# Patient Record
Sex: FEMALE | Race: WHITE | Employment: OTHER | URBAN - METROPOLITAN AREA
[De-identification: names, ages, dates, MRNs, and addresses within clinical notes are randomized per-mention and may not be internally consistent; named-entity substitution may affect disease eponyms.]

---

## 2019-02-27 ENCOUNTER — TRANSFERRED RECORDS (OUTPATIENT)
Dept: HEALTH INFORMATION MANAGEMENT | Facility: CLINIC | Age: 74
End: 2019-02-27

## 2019-02-28 ENCOUNTER — TELEPHONE (OUTPATIENT)
Dept: OPHTHALMOLOGY | Facility: CLINIC | Age: 74
End: 2019-02-28

## 2019-02-28 NOTE — TELEPHONE ENCOUNTER
Patient is scheduled for surgery with Dr. Valdovinos      Spoke or left message with: patient    Date of Surgery: 3/7/19    Location: [facility] - due to cardiac history,insurance    Informed patient they will need an adult  Yes    Pre-op with surgeon (if applicable): VLADIMIR    H&P: Scheduled with Dr. Combs [facility] patient will call and schedule appointment.    Additional imaging/appointments: see and do evaluation on 3/6/19 with Dr. Elizabeth Valdovinos.    Surgery packet: mailed 2/28/19    Additional comments: Advised need adult surgery day and 1 day post op. Dr. Valdovinos will do only 1 day post op appointment. Dr. Ab Eaton will do all other post op appointments.

## 2019-03-04 ENCOUNTER — TRANSFERRED RECORDS (OUTPATIENT)
Dept: HEALTH INFORMATION MANAGEMENT | Facility: CLINIC | Age: 74
End: 2019-03-04

## 2019-03-05 ENCOUNTER — TELEPHONE (OUTPATIENT)
Dept: OPHTHALMOLOGY | Facility: CLINIC | Age: 74
End: 2019-03-05

## 2019-03-05 NOTE — TELEPHONE ENCOUNTER
Returned patient's call regarding surgery for 3/7/19 and [facility] and [facility] both out of network. Spoke with Dr. Valdovinos and we will have to reschedule case to [facility] for a later date. Patient aware I will work on a new surgery date. Advised to call me at [phone] with questions.

## 2019-04-24 ENCOUNTER — OFFICE VISIT (OUTPATIENT)
Dept: OPHTHALMOLOGY | Facility: CLINIC | Age: 74
End: 2019-04-24
Attending: OPHTHALMOLOGY
Payer: COMMERCIAL

## 2019-04-24 DIAGNOSIS — Z48.810 AFTERCARE FOLLOWING SURGERY OF A SENSORY ORGAN: ICD-10-CM

## 2019-04-24 DIAGNOSIS — H40.1133 PRIMARY OPEN ANGLE GLAUCOMA (POAG) OF BOTH EYES, SEVERE STAGE: Primary | ICD-10-CM

## 2019-04-24 PROCEDURE — 92083 EXTENDED VISUAL FIELD XM: CPT | Mod: ZF | Performed by: OPHTHALMOLOGY

## 2019-04-24 PROCEDURE — 76514 ECHO EXAM OF EYE THICKNESS: CPT | Mod: ZF | Performed by: OPHTHALMOLOGY

## 2019-04-24 PROCEDURE — 92133 CPTRZD OPH DX IMG PST SGM ON: CPT | Mod: ZF | Performed by: OPHTHALMOLOGY

## 2019-04-24 PROCEDURE — 92020 GONIOSCOPY: CPT | Mod: ZF | Performed by: OPHTHALMOLOGY

## 2019-04-24 PROCEDURE — G0463 HOSPITAL OUTPT CLINIC VISIT: HCPCS | Mod: ZF

## 2019-04-24 RX ORDER — HYDROCODONE BITARTRATE AND ACETAMINOPHEN 5; 325 MG/1; MG/1
1 TABLET ORAL
COMMUNITY
Start: 2019-04-01

## 2019-04-24 RX ORDER — BENZOCAINE/MENTHOL 6 MG-10 MG
LOZENGE MUCOUS MEMBRANE
COMMUNITY
Start: 2019-03-27

## 2019-04-24 RX ORDER — FLUCONAZOLE 150 MG/1
TABLET ORAL
Refills: 0 | COMMUNITY
Start: 2018-05-25 | End: 2020-02-14

## 2019-04-24 RX ORDER — NITROFURANTOIN 25; 75 MG/1; MG/1
CAPSULE ORAL
Refills: 3 | COMMUNITY
Start: 2019-04-02 | End: 2020-02-14

## 2019-04-24 RX ORDER — ATORVASTATIN CALCIUM 40 MG/1
40 TABLET, FILM COATED ORAL DAILY
COMMUNITY
Start: 2018-12-18 | End: 2020-04-15

## 2019-04-24 RX ORDER — NYSTATIN 100000 U/G
1 OINTMENT TOPICAL
COMMUNITY
Start: 2018-02-19 | End: 2020-02-14

## 2019-04-24 RX ORDER — BACLOFEN 10 MG/1
TABLET ORAL
COMMUNITY
Start: 2009-03-12

## 2019-04-24 RX ORDER — SODIUM CHLORIDE, SODIUM LACTATE, POTASSIUM CHLORIDE, CALCIUM CHLORIDE 600; 310; 30; 20 MG/100ML; MG/100ML; MG/100ML; MG/100ML
INJECTION, SOLUTION INTRAVENOUS CONTINUOUS
Status: CANCELLED | OUTPATIENT
Start: 2019-04-24

## 2019-04-24 RX ORDER — NITROFURANTOIN 25; 75 MG/1; MG/1
CAPSULE ORAL
COMMUNITY
Start: 2019-02-25 | End: 2020-04-09

## 2019-04-24 RX ORDER — PREGABALIN 75 MG/1
75 CAPSULE ORAL DAILY
COMMUNITY
Start: 2019-04-10 | End: 2020-04-15

## 2019-04-24 RX ORDER — CEPHALEXIN 500 MG/1
500 CAPSULE ORAL
COMMUNITY

## 2019-04-24 RX ORDER — AMMONIUM LACTATE 12 G/100G
CREAM TOPICAL
COMMUNITY
Start: 2017-06-07 | End: 2020-02-14

## 2019-04-24 RX ORDER — CLOTRIMAZOLE 1 %
CREAM (GRAM) TOPICAL
COMMUNITY
Start: 2019-03-27

## 2019-04-24 RX ORDER — LANCETS
EACH MISCELLANEOUS
COMMUNITY
Start: 2019-02-20

## 2019-04-24 RX ORDER — HYDROCHLOROTHIAZIDE 25 MG/1
25 TABLET ORAL
COMMUNITY
Start: 2018-04-09 | End: 2020-02-14

## 2019-04-24 RX ORDER — LOSARTAN POTASSIUM 50 MG/1
TABLET ORAL
COMMUNITY
Start: 2019-04-23

## 2019-04-24 RX ORDER — PREDNISOLONE ACETATE 10 MG/ML
1 SUSPENSION/ DROPS OPHTHALMIC 4 TIMES DAILY
Qty: 5 ML | Refills: 3 | Status: SHIPPED | OUTPATIENT
Start: 2019-04-24 | End: 2020-02-14

## 2019-04-24 RX ORDER — CLOPIDOGREL BISULFATE 75 MG/1
TABLET ORAL
Refills: 0 | COMMUNITY
Start: 2018-06-25 | End: 2020-02-14

## 2019-04-24 RX ORDER — BLOOD-GLUCOSE METER
EACH MISCELLANEOUS
COMMUNITY
Start: 2019-02-20

## 2019-04-24 RX ORDER — GABAPENTIN 100 MG/1
300 CAPSULE ORAL AT BEDTIME
COMMUNITY
Start: 2019-04-10

## 2019-04-24 RX ORDER — ISOSORBIDE MONONITRATE 30 MG/1
30 TABLET, EXTENDED RELEASE ORAL
COMMUNITY
Start: 2018-12-18 | End: 2020-02-14

## 2019-04-24 RX ORDER — ROPINIROLE 0.5 MG/1
TABLET, FILM COATED ORAL
Refills: 1 | COMMUNITY
Start: 2019-01-30 | End: 2020-02-14

## 2019-04-24 RX ORDER — NITROGLYCERIN 0.4 MG/1
0.4 TABLET SUBLINGUAL
COMMUNITY
Start: 2017-09-25

## 2019-04-24 RX ORDER — ASPIRIN 81 MG/1
81 TABLET, CHEWABLE ORAL
COMMUNITY
Start: 2018-11-20

## 2019-04-24 RX ORDER — BRIMONIDINE TARTRATE AND TIMOLOL MALEATE 2; 5 MG/ML; MG/ML
SOLUTION OPHTHALMIC
COMMUNITY
Start: 2016-06-20

## 2019-04-24 RX ORDER — BRINZOLAMIDE 10 MG/ML
SUSPENSION/ DROPS OPHTHALMIC
Refills: 6 | COMMUNITY
Start: 2018-05-21 | End: 2020-02-13

## 2019-04-24 ASSESSMENT — PACHYMETRY
OD_CT(UM): 544
OS_CT(UM): 557

## 2019-04-24 ASSESSMENT — REFRACTION_WEARINGRX
OD_SPHERE: -2.50
OS_CYLINDER: +1.25
OS_AXIS: 028
OD_AXIS: 012
OS_SPHERE: -1.50
SPECS_TYPE: SVL
OD_CYLINDER: +1.00

## 2019-04-24 ASSESSMENT — SLIT LAMP EXAM - LIDS
COMMENTS: NORMAL
COMMENTS: NORMAL

## 2019-04-24 ASSESSMENT — VISUAL ACUITY
OS_PH_CC+: -1
OS_CC: 20/30
OS_CC+: -1
METHOD: SNELLEN - LINEAR
CORRECTION_TYPE: GLASSES
OS_PH_CC: 20/25
OD_CC: 20/40

## 2019-04-24 ASSESSMENT — TONOMETRY
OD_IOP_MMHG: 27
OS_IOP_MMHG: 16
IOP_METHOD: APPLANATION

## 2019-04-24 ASSESSMENT — CONF VISUAL FIELD
OD_SUPERIOR_TEMPORAL_RESTRICTION: 3
OS_NORMAL: 1
OD_INFERIOR_NASAL_RESTRICTION: 3

## 2019-04-24 NOTE — NURSING NOTE
Chief Complaints and History of Present Illnesses   Patient presents with     Glaucoma     Chief Complaint(s) and History of Present Illness(es)     Glaucoma     Laterality: both eyes              Comments     Pt. States that IOP RE has been too high on maximum therapy.   Occasional discomfort RE, usually after injection for AMD.  No c/o [facility].  Danisha Caro COT 12:59 PM April 24, 2019

## 2019-04-24 NOTE — PROGRESS NOTES
CC: Referred by Dr Eaton for intraocular pressure too high with max medications right eye     HPI: Episodes of high intraocular pressure right eye over the past couple of years.  Had intraocular pressure spike with a recent Avastin injection.  On maximum medications.   Left eye has been OK     PAST OCULAR HISTORY/PROCEDURES:  Cataract extraction both eyes   Injections for age related macular degeneration right eye   Selective laser trabeculoplasty (SLT) right eye with intraocular pressure spike after that (steroid responder)    MAXIMUM INTRAOCULAR PRESSURE:  Over 60    MEDICATIONS:  Lumigan at bedtime both eyes   Combigan three times a day both eyes   Rhopressa at bedtime right eye     ANTICOAGULANTS/ANTIPLATELETS/FLOMAX: baby aspirin daily    PMH:  Systemic hypertension  Diabetes mellitus   Cardiac disease  Stroke    FAMILY/SOCIAL HISTORY:      + glaucoma mother, father and son      TESTING:  Octopus visual field 24-2   Right eye with decreased MD, defect both hemifields, and poor reliability   Left eye with good reliability and inferior arcuate    OCT retinal nerve fiber layer    Right eye diffuse thinning   Left eye superior thinning    EXAM:  Pallor of optic nerve right eye   Angles open    IMPRESSION:  Primary open angle glaucoma (POAG)    Adv right eye    Mod left eye  Pseudophakia  Age related macular degeneration requiring injections q 5 weeks right eye     PLAN:  Due to frequent injections for age related macular degeneration will do tube rather than trabeculectomy   Baerveldt with corneal patch right eye  Out patient  Block  Risks discussed      Attending Physician Attestation:  Complete documentation of historical and exam elements from today's encounter can be found in the full encounter summary report (not reduplicated in this progress note). I personally obtained the chief complaint(s) and history of present illness. I confirmed and edited asnecessary the review of systems, past medical/surgical  history, family history, social history, and examination findings as documented by others; and I examined the patient myself. I personally reviewed the relevant tests, images, and reports as documented above. I formulated and edited as necessary the assessment and plan and discussed the findings and management plan with the patient and family.  - Elizabeth Valdovinos MD 1:51 PM 4/24/2019    Staying at [facility] room [facility].  Cell [phone]

## 2019-04-25 ENCOUNTER — ANESTHESIA (OUTPATIENT)
Dept: SURGERY | Facility: CLINIC | Age: 74
End: 2019-04-25
Payer: COMMERCIAL

## 2019-04-25 ENCOUNTER — TRANSFERRED RECORDS (OUTPATIENT)
Dept: HEALTH INFORMATION MANAGEMENT | Facility: CLINIC | Age: 74
End: 2019-04-25

## 2019-04-25 ENCOUNTER — HOSPITAL ENCOUNTER (OUTPATIENT)
Facility: CLINIC | Age: 74
Discharge: HOME | End: 2019-04-25
Attending: OPHTHALMOLOGY | Admitting: OPHTHALMOLOGY
Payer: COMMERCIAL

## 2019-04-25 ENCOUNTER — ANESTHESIA EVENT (OUTPATIENT)
Dept: SURGERY | Facility: CLINIC | Age: 74
End: 2019-04-25
Payer: COMMERCIAL

## 2019-04-25 VITALS
BODY MASS INDEX: 30.73 KG/M2 | WEIGHT: 180 LBS | HEART RATE: 62 BPM | RESPIRATION RATE: 16 BRPM | DIASTOLIC BLOOD PRESSURE: 75 MMHG | TEMPERATURE: 97.2 F | HEIGHT: 64 IN | OXYGEN SATURATION: 94 % | SYSTOLIC BLOOD PRESSURE: 130 MMHG

## 2019-04-25 DIAGNOSIS — H40.1113 PRIMARY OPEN ANGLE GLAUCOMA OF RIGHT EYE, SEVERE STAGE: Primary | ICD-10-CM

## 2019-04-25 LAB — GLUCOSE BLDC GLUCOMTR-MCNC: 125 MG/DL (ref 70–99)

## 2019-04-25 PROCEDURE — 71000028 ZZH EYE RECOVERY PHASE 2 EACH 15 MINS: Performed by: OPHTHALMOLOGY

## 2019-04-25 PROCEDURE — 27210794 ZZH OR GENERAL SUPPLY STERILE: Performed by: OPHTHALMOLOGY

## 2019-04-25 PROCEDURE — 25800030 ZZH RX IP 258 OP 636: Performed by: NURSE ANESTHETIST, CERTIFIED REGISTERED

## 2019-04-25 PROCEDURE — 25800030 ZZH RX IP 258 OP 636: Performed by: ANESTHESIOLOGY

## 2019-04-25 PROCEDURE — 25000125 ZZHC RX 250: Performed by: OPHTHALMOLOGY

## 2019-04-25 PROCEDURE — 25000128 H RX IP 250 OP 636: Performed by: NURSE ANESTHETIST, CERTIFIED REGISTERED

## 2019-04-25 PROCEDURE — 37000009 ZZH ANESTHESIA TECHNICAL FEE, EACH ADDTL 15 MIN: Performed by: OPHTHALMOLOGY

## 2019-04-25 PROCEDURE — 25000128 H RX IP 250 OP 636: Performed by: OPHTHALMOLOGY

## 2019-04-25 PROCEDURE — 82962 GLUCOSE BLOOD TEST: CPT

## 2019-04-25 PROCEDURE — C1762 CONN TISS, HUMAN(INC FASCIA): HCPCS | Performed by: OPHTHALMOLOGY

## 2019-04-25 PROCEDURE — 36000104 ZZH EYE SURGERY LEVEL 4 1ST 30 MIN: Performed by: OPHTHALMOLOGY

## 2019-04-25 PROCEDURE — 36000105 ZZH EYE SURGERY LEVEL 4 EA 15 ADDTL MIN: Performed by: OPHTHALMOLOGY

## 2019-04-25 PROCEDURE — 40000170 ZZH STATISTIC PRE-PROCEDURE ASSESSMENT II: Performed by: OPHTHALMOLOGY

## 2019-04-25 PROCEDURE — 37000008 ZZH ANESTHESIA TECHNICAL FEE, 1ST 30 MIN: Performed by: OPHTHALMOLOGY

## 2019-04-25 PROCEDURE — 25000125 ZZHC RX 250: Performed by: NURSE ANESTHETIST, CERTIFIED REGISTERED

## 2019-04-25 PROCEDURE — C1783 OCULAR IMP, AQUEOUS DRAIN DE: HCPCS | Performed by: OPHTHALMOLOGY

## 2019-04-25 DEVICE — EYE IMP GRAFT VISIONGRAFT CORNEA 1/2MOON 9MM CO301AL-90: Type: IMPLANTABLE DEVICE | Site: EYE | Status: FUNCTIONAL

## 2019-04-25 DEVICE — EYE IMP VALVE BAERVELDT 350 BG101-350: Type: IMPLANTABLE DEVICE | Site: EYE | Status: FUNCTIONAL

## 2019-04-25 RX ORDER — DEXAMETHASONE SODIUM PHOSPHATE 4 MG/ML
INJECTION, SOLUTION INTRA-ARTICULAR; INTRALESIONAL; INTRAMUSCULAR; INTRAVENOUS; SOFT TISSUE PRN
Status: DISCONTINUED | OUTPATIENT
Start: 2019-04-25 | End: 2019-04-25 | Stop reason: HOSPADM

## 2019-04-25 RX ORDER — BALANCED SALT SOLUTION 6.4; .75; .48; .3; 3.9; 1.7 MG/ML; MG/ML; MG/ML; MG/ML; MG/ML; MG/ML
SOLUTION OPHTHALMIC PRN
Status: DISCONTINUED | OUTPATIENT
Start: 2019-04-25 | End: 2019-04-25 | Stop reason: HOSPADM

## 2019-04-25 RX ORDER — PROPOFOL 10 MG/ML
INJECTION, EMULSION INTRAVENOUS PRN
Status: DISCONTINUED | OUTPATIENT
Start: 2019-04-25 | End: 2019-04-25

## 2019-04-25 RX ORDER — ONDANSETRON 2 MG/ML
INJECTION INTRAMUSCULAR; INTRAVENOUS PRN
Status: DISCONTINUED | OUTPATIENT
Start: 2019-04-25 | End: 2019-04-25

## 2019-04-25 RX ORDER — SODIUM CHLORIDE, SODIUM LACTATE, POTASSIUM CHLORIDE, CALCIUM CHLORIDE 600; 310; 30; 20 MG/100ML; MG/100ML; MG/100ML; MG/100ML
500 INJECTION, SOLUTION INTRAVENOUS CONTINUOUS
Status: DISCONTINUED | OUTPATIENT
Start: 2019-04-25 | End: 2019-04-25 | Stop reason: HOSPADM

## 2019-04-25 RX ORDER — FENTANYL CITRATE 50 UG/ML
INJECTION, SOLUTION INTRAMUSCULAR; INTRAVENOUS PRN
Status: DISCONTINUED | OUTPATIENT
Start: 2019-04-25 | End: 2019-04-25

## 2019-04-25 RX ORDER — NEOMYCIN SULFATE, POLYMYXIN B SULFATE, AND DEXAMETHASONE 3.5; 10000; 1 MG/G; [USP'U]/G; MG/G
OINTMENT OPHTHALMIC PRN
Status: DISCONTINUED | OUTPATIENT
Start: 2019-04-25 | End: 2019-04-25 | Stop reason: HOSPADM

## 2019-04-25 RX ADMIN — DEXMEDETOMIDINE HYDROCHLORIDE 8 MCG: 100 INJECTION, SOLUTION INTRAVENOUS at 13:11

## 2019-04-25 RX ADMIN — PROPOFOL 20 MG: 10 INJECTION, EMULSION INTRAVENOUS at 12:42

## 2019-04-25 RX ADMIN — MIDAZOLAM 1 MG: 1 INJECTION INTRAMUSCULAR; INTRAVENOUS at 12:33

## 2019-04-25 RX ADMIN — ONDANSETRON 4 MG: 2 INJECTION INTRAMUSCULAR; INTRAVENOUS at 12:45

## 2019-04-25 RX ADMIN — FENTANYL CITRATE 50 MCG: 50 INJECTION, SOLUTION INTRAMUSCULAR; INTRAVENOUS at 13:19

## 2019-04-25 RX ADMIN — PROPOFOL 30 MG: 10 INJECTION, EMULSION INTRAVENOUS at 12:41

## 2019-04-25 RX ADMIN — SODIUM CHLORIDE, SODIUM LACTATE, POTASSIUM CHLORIDE, CALCIUM CHLORIDE: 600; 310; 30; 20 INJECTION, SOLUTION INTRAVENOUS at 12:08

## 2019-04-25 RX ADMIN — PROPOFOL 10 MG: 10 INJECTION, EMULSION INTRAVENOUS at 13:11

## 2019-04-25 RX ADMIN — DEXMEDETOMIDINE HYDROCHLORIDE 4 MCG: 100 INJECTION, SOLUTION INTRAVENOUS at 13:15

## 2019-04-25 RX ADMIN — MIDAZOLAM 1 MG: 1 INJECTION INTRAMUSCULAR; INTRAVENOUS at 13:11

## 2019-04-25 ASSESSMENT — LIFESTYLE VARIABLES: TOBACCO_USE: 0

## 2019-04-25 ASSESSMENT — ENCOUNTER SYMPTOMS
DYSRHYTHMIAS: 0
SEIZURES: 0

## 2019-04-25 ASSESSMENT — COPD QUESTIONNAIRES: COPD: 0

## 2019-04-25 ASSESSMENT — MIFFLIN-ST. JEOR: SCORE: 1291.47

## 2019-04-25 NOTE — DISCHARGE INSTRUCTIONS
Discharge Instructions  Post-Operative Glaucoma Surgery  Dr. Valdovinos      Pain Management:      Some mild discomfort is normal following surgery.  If you are currently taking any medications for pain, you may continue to take what you normally do.  Otherwise, you may take an over-the-counter medication such as Tylenol (acetaminophen) or ibuprofen (Advil) for relief.  Take as instructed on the bottle.    If you are experiencing uncontrollable pain or have other concerns, call [phone].  If it is after hours, please ask for the Doctor on call for the eye clinic.    Other Medications:    No eye drops to the operative eye tonight.  Please remember to bring all your drops/supplies with you to your post-op appointment.  You will be instructed on the drops at that time.    Do not take glaucoma pills.    You may resume your regular home medications, including any drops you are using in your NON-operative eye.    General Rules:    Avoid strenuous activity. Do not do anything that would cause you to strain or make your face red. Open your mouth if you cough, use a laxative if necessary, do not lift greater than 15 pounds.    Keep your head above your heart.  Do not bend down lower than waist level.  Bend with your knees.    Keep the bandage over your eye.  The doctor will remove the bandage at your appointment at the clinic.    Try to write down any questions you may have to bring to your post-op appointment.    Be restful today.     Discharge Instructions  Post-Operative Glaucoma Surgery  Dr. Valdovinos      Pain Management:      Some mild discomfort is normal following surgery.  If you are currently taking any medications for pain, you may continue to take what you normally do.  Otherwise, you may take an over-the-counter medication such as Tylenol (acetaminophen) or ibuprofen (Advil) for relief.  Take as instructed on the bottle.    If you are experiencing uncontrollable pain or have other concerns, call [phone].   If it is after hours, please ask for the Doctor on call for the eye clinic.    Other Medications:    No eye drops to the operative eye tonight.  Please remember to bring all your drops/supplies with you to your post-op appointment.  You will be instructed on the drops at that time.    Do not take glaucoma pills.    You may resume your regular home medications, including any drops you are using in your NON-operative eye.    General Rules:    Avoid strenuous activity. Do not do anything that would cause you to strain or make your face red. Open your mouth if you cough, use a laxative if necessary, do not lift greater than 15 pounds.    Keep your head above your heart.  Do not bend down lower than waist level.  Bend with your knees.    Keep the bandage over your eye.  The doctor will remove the bandage at your appointment at the clinic.    Try to write down any questions you may have to bring to your post-op appointment.    Be restful today.     Discharge Instructions  Post-Operative Glaucoma Surgery  Dr. Valdovinos      Pain Management:      Some mild discomfort is normal following surgery.  If you are currently taking any medications for pain, you may continue to take what you normally do.  Otherwise, you may take an over-the-counter medication such as Tylenol (acetaminophen) or ibuprofen (Advil) for relief.  Take as instructed on the bottle.    If you are experiencing uncontrollable pain or have other concerns, call [phone].  If it is after hours, please ask for the Doctor on call for the eye clinic.    Other Medications:    No eye drops to the operative eye tonight.  Please remember to bring all your drops/supplies with you to your post-op appointment.  You will be instructed on the drops at that time.    Do not take glaucoma pills.    You may resume your regular home medications, including any drops you are using in your NON-operative eye.    General Rules:    Avoid strenuous activity. Do not do anything that  would cause you to strain or make your face red. Open your mouth if you cough, use a laxative if necessary, do not lift greater than 15 pounds.    Keep your head above your heart.  Do not bend down lower than waist level.  Bend with your knees.    Keep the bandage over your eye.  The doctor will remove the bandage at your appointment at the clinic.    Try to write down any questions you may have to bring to your post-op appointment.    Be restful today.  [facility] Anesthesia [facility] Discharge  Instructions  Anesthesia (Eye Care Center)   Adult Discharge Instructions    For 24 hours after surgery    1. Get plenty of rest.  Make arrangements to have a responsible adult stay with you for at least 24 hours after you leave the hospital.  2. Do not drive or use heavy equipment for 24 hours.    3. Do not drink alcohol for 24 hours.  4. Do not sign legal documents or make important decisions for 24 hours.  5. Avoid strenuous or risky activities. You may feel lightheaded.  If so, sit for a few minutes before standing.  Have someone help you get up.   6. Conscious sedation patients may resume a regular diet..  7. Any questions of medical nature, call your physician.

## 2019-04-25 NOTE — ANESTHESIA CARE TRANSFER NOTE
Patient: Krystian Ramos    Procedure(s):  RIGHT EYE BAERVELDT TUBE WITH CORNEAL PATCH GRAFT    Diagnosis: GLAUCOMA  Diagnosis Additional Information: No value filed.    Anesthesia Type:   MAC     Note:  Airway :Room Air  Patient transferred to:Phase II  Comments: Transferred to Eye Center recovery room in recliner with armrests up, spontaneous respirations, O2 saturation maintained greater than 95% with oxygen via room air. All monitors and alarms on and functioning, clinically stable vital signs. Report given to recovery RN and questions answered. Patient alert and following verbal directions.Handoff Report: Identifed the Patient, Identified the Reponsible Provider, Reviewed the pertinent medical history, Discussed the surgical course, Reviewed Intra-OP anesthesia mangement and issues during anesthesia, Set expectations for post-procedure period and Allowed opportunity for questions and acknowledgement of understanding      Vitals: (Last set prior to Anesthesia Care Transfer)    CRNA VITALS  4/25/2019 1320 - 4/25/2019 1354      4/25/2019             Pulse:  62    Ht Rate:  60    SpO2:  97 %    Resp Rate (set):  10                Electronically Signed By: ARABELLA Seo CRNA  April 25, 2019  1:54 PM

## 2019-04-25 NOTE — ANESTHESIA POSTPROCEDURE EVALUATION
Patient: Krystian Ramos    Procedure(s):  RIGHT EYE BAERVELDT TUBE WITH CORNEAL PATCH GRAFT    Diagnosis:GLAUCOMA  Diagnosis Additional Information: No value filed.    Anesthesia Type:  MAC    Note:  Anesthesia Post Evaluation    Patient location during evaluation: Bedside  Patient participation: Able to fully participate in evaluation  Level of consciousness: awake and alert  Pain management: adequate  Airway patency: patent  Cardiovascular status: acceptable  Respiratory status: acceptable  Hydration status: acceptable  PONV: none             Last vitals:  Vitals:    04/25/19 1149 04/25/19 1352   BP: 135/85 130/75   Pulse:  62   Resp: 18 16   Temp: 36.2  C (97.2  F)    SpO2: 96% 94%         Electronically Signed By: Cheo Wright MD  April 25, 2019  5:05 PM

## 2019-04-25 NOTE — PROCEDURES
PREOPERATIVE DIAGNOSES: Primary open angle glaucoma (POAG),adv right eye     POSTOPERATIVE DIAGNOSES: same     PROCEDURE: Placement of Baerveldt implant with corneal patch graft, right eye.   SURGEON: Elizabeth Valdovinos MD   ASSISTANT: none.   ANESTHESIA: Monitored anesthesia with regional block.   COMPLICATIONS: none     PROCEDURE: The patient was brought to the operating room where a retrobulbar block was given. The right eye was prepped and draped in the usual sterile manner and a 7-0 Vicryl traction suture was placed in the superior temporal cornea. An incision was made through conjunctiva and Tenon's 6 mm posterior to the limbus and the dissection was carried forward to the limbus and posteriorly until the Ab scissors could be spread between the rectus muscles.  A 350 mm Baerveldt was placed with one wing under the lateral and one wing under the superior rectus muscle. It was sutured to the globe with two 9-0 Prolene sutures with the knots buried. The tube was tied off near the plate with a 7-0 Vicryl suture, and the ligation was tested and found to be watertight. The tube was trimmed and placed into the eye through a 23 gauge needle tract. The tube was covered with a VisionGraft corneal patch graft after 3 slits were made in the tube with a 7-0 Vicryl needle. The corneal graft was sutured to the globe with a 7-0 Vicryl mattress suture. Tenon's was closed with running 7-0 Vicryl suture and conjunctiva was closed with a running 9-0 Vicryl suture on a vascular needle. The wound was checked and found to be free from leaks. A paracentesis tract was made. Cefuroxime was placed into the anterior chamber.  Dexamethasone 0.5 mL was injected subconjunctivally. The eye was patched and shielded with Maxitrol ointment and the patient left the operating room in good condition.

## 2019-04-25 NOTE — ANESTHESIA PREPROCEDURE EVALUATION
Anesthesia Pre-Procedure Evaluation    Patient: Krystian Ramos   MRN: [id number] : 1945          Preoperative Diagnosis: GLAUCOMA    Procedure(s):  RIGHT EYE BAERVELDT TUBE WITH CORNEAL PATCH GRAFT    Past Medical History:   Diagnosis Date     Coronary artery disease 2016    x1 coronary stent      Diabetes (H)      History of coronary artery bypass graft 2015    x3 vessels     Hypercholesteremia      Hypertension      Macular degeneration      Stroke (H) 2006     Past Surgical History:   Procedure Laterality Date     APPENDECTOMY       CARDIAC SURGERY      3V CABG     CATARACT IOL, RT/LT Bilateral      GENITOURINARY SURGERY  2018    Bladder sling surgery     GYN SURGERY      TUBAL LIGATION     HC TRABECULOPLASTY BY LASER SURGERY Right      ORTHOPEDIC SURGERY Right     TKA     SHOULDER SURGERY         Anesthesia Evaluation     . Pt has had prior anesthetic. Type: General (MAC 3=greade 1 view)    No history of anesthetic complications          ROS/MED HX    ENT/Pulmonary:     (+)sleep apnea, doesn't use CPAP Noncompliant cmH2O , . .   (-) tobacco use, asthma and COPD   Neurologic:     (+)CVA with deficits- Left hemiplegia,    (-) seizures and TIA   Cardiovascular:     (+) hypertension--CAD, -past MI,CABG-date: x3, in , stent,2017  Drug Eluting Stent .. Taking blood thinners : . . . :. . Previous cardiac testing Echodate:2017results:Left Ventricle:   Normal left ventricular size. Normal left ventricular wall thickness. Normal left ventricular systolic function. The ejection fraction   is visually estimated to be 60 %. The apical inferior segment is dyskinetic. All remaining scored wall segments are with no wall   motion abnormalities.    Aortic Valve:   The aortic valve is tricuspid. Mild aortic cuspal thickening. No aortic stenosis. No significant aortic regurgitation.    Aorta:   The sinus of valsalva is normal in size measuring 26.0 mm. The ascending aorta is normal in size measuring 34.0 mm.    Mitral  Valve:   Mild-to-moderate mitral leaflet thickening. No mitral stenosis. Trivial mitral regurgitation.    Left Atrium:   Normal left atrial size.    Pulmonic Valve:   Trivial pulmonary regurgitation.    IAS:   No gross evidence of shunt flow seen; however the possibility of a PFO cannot be completely ruled out.    Right Ventricle:   Normal right ventricular size. Normal right ventricular systolic function. Normal right ventricular wall thickness.    Pulmonary Artery:   No significant pulmonary artery hypertension. Pulmonary artery systolic pressure is measured at 38 mmHg.    Tricuspid Valve:   Mild tricuspid regurgitation.    Right Atrium:   The right atrium was upper normal in size.    IVC:   Normal IVC size with normal respirophasic changes.    Pericardium:   No significant pericardial effusion.date: results: date: results: date: results:         (-) CHF and arrhythmias   METS/Exercise Tolerance:  3 - Able to walk 1-2 blocks without stopping   Hematologic:         Musculoskeletal:         GI/Hepatic:     (+) GERD Asymptomatic on medication,      (-) liver disease   Renal/Genitourinary:      (-) renal disease   Endo: Comment: BMI 31    (+) type II DM Obesity, .   (-) Type I DM   Psychiatric:         Infectious Disease:         Malignancy:         Other:                          Physical Exam  Normal systems: dental    Airway   Mallampati: II  TM distance: >3 FB  Neck ROM: full    Dental     Cardiovascular   Rhythm and rate: regular      Pulmonary    breath sounds clear to auscultation            No results found for: WBC, HGB, HCT, PLT, CRP, SED, NA, POTASSIUM, CHLORIDE, CO2, BUN, CR, GLC, ML, PHOS, MAG, ALBUMIN, PROTTOTAL, ALT, AST, GGT, ALKPHOS, BILITOTAL, BILIDIRECT, LIPASE, AMYLASE, JOVON, PTT, INR, FIBR, TSH, T4, T3, HCG, HCGS, CKTOTAL, CKMB, TROPN    Preop Vitals  BP Readings from Last 3 Encounters:   04/25/19 135/85    Pulse Readings from Last 3 Encounters:   No data found for Pulse      Resp Readings from  "Last 3 Encounters:   04/25/19 18    SpO2 Readings from Last 3 Encounters:   04/25/19 96%      Temp Readings from Last 1 Encounters:    Ht Readings from Last 1 Encounters:   04/25/19 1.626 m (5' 4\")      Wt Readings from Last 1 Encounters:   04/25/19 81.6 kg (180 lb)    Estimated body mass index is 30.9 kg/m  as calculated from the following:    Height as of this encounter: 1.626 m (5' 4\").    Weight as of this encounter: 81.6 kg (180 lb).       Anesthesia Plan      History & Physical Review  History and physical reviewed and following examination; no interval change.    ASA Status:  3 .    NPO Status:  > 8 hours    Plan for MAC   PONV prophylaxis:  Ondansetron (or other 5HT-3)       Postoperative Care      Consents  Anesthetic plan, risks, benefits and alternatives discussed with:  Patient..                 Cheo Wright MD  "

## 2019-04-26 ENCOUNTER — OFFICE VISIT (OUTPATIENT)
Dept: OPHTHALMOLOGY | Facility: CLINIC | Age: 74
End: 2019-04-26
Attending: OPHTHALMOLOGY
Payer: COMMERCIAL

## 2019-04-26 DIAGNOSIS — T85.398A EXPOSURE OF ORBITAL IMPLANT, INITIAL ENCOUNTER: ICD-10-CM

## 2019-04-26 DIAGNOSIS — Z48.810 AFTERCARE FOLLOWING SURGERY OF A SENSORY ORGAN: Primary | ICD-10-CM

## 2019-04-26 PROCEDURE — G0463 HOSPITAL OUTPT CLINIC VISIT: HCPCS | Mod: ZF

## 2019-04-26 ASSESSMENT — TONOMETRY
OD_IOP_MMHG: 15
OS_IOP_MMHG: 14
IOP_METHOD: APPLANATION
OD_IOP_MMHG: 33
IOP_METHOD: ICARE
OS_IOP_MMHG: 14

## 2019-04-26 ASSESSMENT — VISUAL ACUITY
OD_SC: 20/150
OD_PH_SC: 20/70
OS_SC: 20/80
OS_PH_SC: 20/30
METHOD: SNELLEN - LINEAR

## 2019-04-26 ASSESSMENT — SLIT LAMP EXAM - LIDS
COMMENTS: NORMAL
COMMENTS: NORMAL

## 2019-04-26 NOTE — LETTER
2019      Seven Angelito, MD  [address] [address]  Fax: [phone]      RE: KRYSTIAN HANEY  : 1945      Dear Dr. Eaton:    I had the pleasure of seeing Krystian Haney on 2019 at the [facility].  My exam findings are as follows:      Visual Acuity (Snellen - Linear)       Right Left    Dist sc 20/150 20/80    Dist ph sc 20/70 20/30   Forgot glasses        Tonometry (Applanation, 11:10 AM)       Right Left    Pressure 15 14      Tonometry #2 ([facility], 11:14 AM)       Right Left    Pressure 33 14      Tonometry Comments    Noted pain with eye movement left and right gaze.        Main Ophthalmology Exam     Slit Lamp Exam       Right Left    Lids/Lashes Normal Normal    Conjunctiva/Sclera incision intact White and quiet    Cornea Clear Clear    Anterior Chamber 1+ Cell Deep and quiet    Iris Round and reactive Round and reactive    Lens Posterior chamber intraocular lens Posterior chamber intraocular lens          Fundus Exam       Right Left    Disc Pallor Pallor    C/D Ratio Vertical 0.4 centrally with sloping loss of temporal rim 0.3    Macula Retinal pigment epithelial mottling Normal             History and Present Illness:   Referred by Dr. Eaton for intraocular pressure too high with max medications right eye.     Episodes of high intraocular pressure right eye over the past couple of years.  Had intraocular pressure spike with a recent Avastin injection.  On maximum medications.  Left eye has been OK.     Past Ocular History/Procedures:   Cataract extraction both eyes   Injections for age related macular degeneration right eye   Selective laser trabeculoplasty (SLT) right eye with intraocular pressure spike after that (steroid responder)    Maximum Intraocular Pressure: Over 60    Medications:    Lumigan at bedtime both eyes   Combigan three times a day both eyes   Rhopressa at bedtime right eye until gone  Prednisolone taper right  eye     ANTICOAGULANTS/ANTIPLATELETS/FLOMAX: baby aspirin daily    Past Medical History:   Systemic hypertension  Diabetes mellitus   Cardiac disease  Stroke    Family/Social History:      + glaucoma mother, father and son      Testing:   Octopus visual field 24-2   Right eye with decreased MD, defect both hemifields, and poor reliability   Left eye with good reliability and inferior arcuate    OCT retinal nerve fiber layer    Right eye diffuse thinning   Left eye superior thinning    Exam:   Pallor of optic nerve right eye   Angles open    Impression:   Primary open angle glaucoma (POAG)    Adv right eye    Mod left eye  Pseudophakia  Age related macular degeneration requiring injections q 5 weeks right eye     Plan:   Due to frequent injections for age related macular degeneration will do tube rather than trabeculectomy   Baerveldt will open in about 6 weeks  OK to have Avastin in a couple of weeks    Postoperative instructions and sheet given including no bending, no lifting more than 10 pounds  Prednisolone four times a day for 1 week, three times a day for 1 week, twice a day for 1 week, once a day for 1 week, then stop.  See Angelito next week ([place])      Thank you for allowing me to participate in her care.       Sincerely,       Elizabeth Valdovinos MD  Glaucoma   [employer]

## 2019-04-26 NOTE — PROGRESS NOTES
CC: Referred by Dr Eaton for intraocular pressure too high with max medications right eye     HPI: Episodes of high intraocular pressure right eye over the past couple of years.  Had intraocular pressure spike with a recent Avastin injection.  On maximum medications.   Left eye has been OK     PAST OCULAR HISTORY/PROCEDURES:  Cataract extraction both eyes   Injections for age related macular degeneration right eye   Selective laser trabeculoplasty (SLT) right eye with intraocular pressure spike after that (steroid responder)    MAXIMUM INTRAOCULAR PRESSURE:  Over 60    MEDICATIONS:  Lumigan at bedtime both eyes   Combigan three times a day both eyes   Rhopressa at bedtime right eye until gone  Prednisolone taper right eye     ANTICOAGULANTS/ANTIPLATELETS/FLOMAX: baby aspirin daily    PMH:  Systemic hypertension  Diabetes mellitus   Cardiac disease  Stroke    FAMILY/SOCIAL HISTORY:      + glaucoma mother, father and son      TESTING:  Octopus visual field 24-2   Right eye with decreased MD, defect both hemifields, and poor reliability   Left eye with good reliability and inferior arcuate    OCT retinal nerve fiber layer    Right eye diffuse thinning   Left eye superior thinning    EXAM:  Pallor of optic nerve right eye   Angles open    IMPRESSION:  Primary open angle glaucoma (POAG)    Adv right eye    Mod left eye  Pseudophakia  Age related macular degeneration requiring injections q 5 weeks right eye     PLAN:  Due to frequent injections for age related macular degeneration will do tube rather than trabeculectomy   Baerveldt will open in about 6 weeks  OK to have Avastin in a couple of weeks    Postoperative instructions and sheet given including no bending, no lifting more than 10 pounds  Prednisolone four times a day for 1 week, three times a day for 1 week, twice a day for 1 week, once a day for 1 week, then stop.  See Angelito next week ([place])      Attending Physician Attestation:  Complete documentation of  historical and exam elements from today's encounter can be found in the full encounter summary report (not reduplicated in this progress note). I personally obtained the chief complaint(s) and history of present illness. I confirmed and edited asnecessary the review of systems, past medical/surgical history, family history, social history, and examination findings as documented by others; and I examined the patient myself. I personally reviewed the relevant tests, images, and reports as documented above. I formulated and edited as necessary the assessment and plan and discussed the findings and management plan with the patient and family.  - Elizabeth Valdovinos MD 12:07 PM 4/26/2019      Staying at [facility] room [facility].  Cell [phone]

## 2019-04-26 NOTE — Clinical Note
4/26/2019       RE: Krystian Ramos  [address] [place] [address]     Dear Colleague,    Thank you for referring your patient, Krystian Ramos, to the EYE CLINIC at [facility]. Please see a copy of my visit note below.    CC: Referred by Dr Eaton for intraocular pressure too high with max medications right eye     HPI: Episodes of high intraocular pressure right eye over the past couple of years.  Had intraocular pressure spike with a recent Avastin injection.  On maximum medications.   Left eye has been OK     PAST OCULAR HISTORY/PROCEDURES:  Cataract extraction both eyes   Injections for age related macular degeneration right eye   Selective laser trabeculoplasty (SLT) right eye with intraocular pressure spike after that (steroid responder)    MAXIMUM INTRAOCULAR PRESSURE:  Over 60    MEDICATIONS:  Lumigan at bedtime both eyes   Combigan three times a day both eyes   Rhopressa at bedtime right eye until gone  Prednisolone taper right eye     ANTICOAGULANTS/ANTIPLATELETS/FLOMAX: baby aspirin daily    PMH:  Systemic hypertension  Diabetes mellitus   Cardiac disease  Stroke    FAMILY/SOCIAL HISTORY:      + glaucoma mother, father and son      TESTING:  Octopus visual field 24-2   Right eye with decreased MD, defect both hemifields, and poor reliability   Left eye with good reliability and inferior arcuate    OCT retinal nerve fiber layer    Right eye diffuse thinning   Left eye superior thinning    EXAM:  Pallor of optic nerve right eye   Angles open    IMPRESSION:  Primary open angle glaucoma (POAG)    Adv right eye    Mod left eye  Pseudophakia  Age related macular degeneration requiring injections q 5 weeks right eye     PLAN:  Due to frequent injections for age related macular degeneration will do tube rather than trabeculectomy   Baerveldt will open in about 6 weeks  OK to have Avastin in a couple of weeks    Postoperative instructions and sheet given including no bending,  no lifting more than 10 pounds  Prednisolone four times a day for 1 week, three times a day for 1 week, twice a day for 1 week, once a day for 1 week, then stop.  See Angelito next week ([place])      Attending Physician Attestation:  Complete documentation of historical and exam elements from today's encounter can be found in the full encounter summary report (not reduplicated in this progress note). I personally obtained the chief complaint(s) and history of present illness. I confirmed and edited asnecessary the review of systems, past medical/surgical history, family history, social history, and examination findings as documented by others; and I examined the patient myself. I personally reviewed the relevant tests, images, and reports as documented above. I formulated and edited as necessary the assessment and plan and discussed the findings and management plan with the patient and family.  - Elizabeth Valdovinos MD 12:07 PM 4/26/2019      Staying at [place] [address].  Cell [phone]      Again, thank you for allowing me to participate in the care of your patient.      Sincerely,    Elizabeth Valdovinos MD

## 2019-04-26 NOTE — NURSING NOTE
Patient presents for 1 day s/p RIGHT EYE BAERVELDT TUBE WITH CORNEAL PATCH GRAFT. Slept well last night. POTC Tylenol for relief. Mild itching. Patch removed by technician. Patient can see light. Eye appears pink. Mild pain with eye movement, discomfort. Ordered and brang in rx eye drops. Angelia Samano COT 11:06 AM April 26, 2019

## 2019-05-07 ENCOUNTER — TRANSFERRED RECORDS (OUTPATIENT)
Dept: HEALTH INFORMATION MANAGEMENT | Facility: CLINIC | Age: 74
End: 2019-05-07

## 2020-02-11 ENCOUNTER — TRANSFERRED RECORDS (OUTPATIENT)
Dept: HEALTH INFORMATION MANAGEMENT | Facility: CLINIC | Age: 75
End: 2020-02-11

## 2020-02-12 ENCOUNTER — HOSPITAL ENCOUNTER (OUTPATIENT)
Facility: AMBULATORY SURGERY CENTER | Age: 75
End: 2020-02-12
Attending: OPHTHALMOLOGY
Payer: COMMERCIAL

## 2020-02-12 ENCOUNTER — ANESTHESIA EVENT (OUTPATIENT)
Dept: SURGERY | Facility: CLINIC | Age: 75
End: 2020-02-12

## 2020-02-12 DIAGNOSIS — T85.398A EXPOSURE OF ORBITAL IMPLANT, INITIAL ENCOUNTER: ICD-10-CM

## 2020-02-12 RX ORDER — FENTANYL CITRATE 50 UG/ML
25-50 INJECTION, SOLUTION INTRAMUSCULAR; INTRAVENOUS
Status: CANCELLED | OUTPATIENT
Start: 2020-02-12

## 2020-02-12 RX ORDER — ACETAMINOPHEN 325 MG/1
975 TABLET ORAL ONCE
Status: CANCELLED | OUTPATIENT
Start: 2020-02-12 | End: 2020-02-12

## 2020-02-12 RX ORDER — ONDANSETRON 2 MG/ML
4 INJECTION INTRAMUSCULAR; INTRAVENOUS EVERY 30 MIN PRN
Status: CANCELLED | OUTPATIENT
Start: 2020-02-12

## 2020-02-12 RX ORDER — OXYCODONE HYDROCHLORIDE 5 MG/1
5 TABLET ORAL EVERY 4 HOURS PRN
Status: CANCELLED | OUTPATIENT
Start: 2020-02-12

## 2020-02-12 RX ORDER — SODIUM CHLORIDE, SODIUM LACTATE, POTASSIUM CHLORIDE, CALCIUM CHLORIDE 600; 310; 30; 20 MG/100ML; MG/100ML; MG/100ML; MG/100ML
INJECTION, SOLUTION INTRAVENOUS CONTINUOUS
Status: CANCELLED | OUTPATIENT
Start: 2020-02-12

## 2020-02-12 RX ORDER — ONDANSETRON 4 MG/1
4 TABLET, ORALLY DISINTEGRATING ORAL EVERY 30 MIN PRN
Status: CANCELLED | OUTPATIENT
Start: 2020-02-12

## 2020-02-12 RX ORDER — MEPERIDINE HYDROCHLORIDE 25 MG/ML
12.5 INJECTION INTRAMUSCULAR; INTRAVENOUS; SUBCUTANEOUS
Status: CANCELLED | OUTPATIENT
Start: 2020-02-12

## 2020-02-12 RX ORDER — NALOXONE HYDROCHLORIDE 0.4 MG/ML
.1-.4 INJECTION, SOLUTION INTRAMUSCULAR; INTRAVENOUS; SUBCUTANEOUS
Status: CANCELLED | OUTPATIENT
Start: 2020-02-12 | End: 2020-02-13

## 2020-02-13 ENCOUNTER — TELEPHONE (OUTPATIENT)
Dept: OPHTHALMOLOGY | Facility: CLINIC | Age: 75
End: 2020-02-13

## 2020-02-13 ENCOUNTER — ANESTHESIA (OUTPATIENT)
Dept: SURGERY | Facility: CLINIC | Age: 75
End: 2020-02-13

## 2020-02-13 ENCOUNTER — ANESTHESIA (OUTPATIENT)
Dept: SURGERY | Facility: AMBULATORY SURGERY CENTER | Age: 75
End: 2020-02-13

## 2020-02-13 ENCOUNTER — ANESTHESIA EVENT (OUTPATIENT)
Dept: SURGERY | Facility: AMBULATORY SURGERY CENTER | Age: 75
End: 2020-02-13

## 2020-02-13 ENCOUNTER — HOSPITAL ENCOUNTER (OUTPATIENT)
Facility: AMBULATORY SURGERY CENTER | Age: 75
End: 2020-02-13
Attending: OPHTHALMOLOGY
Payer: COMMERCIAL

## 2020-02-13 ENCOUNTER — TRANSFERRED RECORDS (OUTPATIENT)
Dept: HEALTH INFORMATION MANAGEMENT | Facility: CLINIC | Age: 75
End: 2020-02-13

## 2020-02-13 VITALS
DIASTOLIC BLOOD PRESSURE: 80 MMHG | TEMPERATURE: 98.2 F | HEART RATE: 59 BPM | OXYGEN SATURATION: 96 % | RESPIRATION RATE: 14 BRPM | SYSTOLIC BLOOD PRESSURE: 149 MMHG | BODY MASS INDEX: 32.27 KG/M2 | WEIGHT: 188 LBS

## 2020-02-13 DIAGNOSIS — T85.398A EXPOSURE OF ORBITAL IMPLANT, INITIAL ENCOUNTER: Primary | ICD-10-CM

## 2020-02-13 LAB — GLUCOSE BLDC GLUCOMTR-MCNC: 134 MG/DL (ref 70–99)

## 2020-02-13 DEVICE — EYE IMP TUTOPLAST SCLERA .5X.8CM 68333: Type: IMPLANTABLE DEVICE | Site: EYE | Status: FUNCTIONAL

## 2020-02-13 RX ORDER — SODIUM CHLORIDE, SODIUM LACTATE, POTASSIUM CHLORIDE, CALCIUM CHLORIDE 600; 310; 30; 20 MG/100ML; MG/100ML; MG/100ML; MG/100ML
500 INJECTION, SOLUTION INTRAVENOUS CONTINUOUS
Status: DISCONTINUED | OUTPATIENT
Start: 2020-02-13 | End: 2020-02-13 | Stop reason: HOSPADM

## 2020-02-13 RX ORDER — ONDANSETRON 2 MG/ML
INJECTION INTRAMUSCULAR; INTRAVENOUS PRN
Status: DISCONTINUED | OUTPATIENT
Start: 2020-02-13 | End: 2020-02-13

## 2020-02-13 RX ORDER — NALOXONE HYDROCHLORIDE 0.4 MG/ML
.1-.4 INJECTION, SOLUTION INTRAMUSCULAR; INTRAVENOUS; SUBCUTANEOUS
Status: DISCONTINUED | OUTPATIENT
Start: 2020-02-13 | End: 2020-02-14 | Stop reason: HOSPADM

## 2020-02-13 RX ORDER — SODIUM CHLORIDE, SODIUM LACTATE, POTASSIUM CHLORIDE, CALCIUM CHLORIDE 600; 310; 30; 20 MG/100ML; MG/100ML; MG/100ML; MG/100ML
INJECTION, SOLUTION INTRAVENOUS CONTINUOUS
Status: DISCONTINUED | OUTPATIENT
Start: 2020-02-13 | End: 2020-02-14 | Stop reason: HOSPADM

## 2020-02-13 RX ORDER — PROPOFOL 10 MG/ML
INJECTION, EMULSION INTRAVENOUS PRN
Status: DISCONTINUED | OUTPATIENT
Start: 2020-02-13 | End: 2020-02-13

## 2020-02-13 RX ORDER — LIDOCAINE 40 MG/G
CREAM TOPICAL
Status: DISCONTINUED | OUTPATIENT
Start: 2020-02-13 | End: 2020-02-13 | Stop reason: HOSPADM

## 2020-02-13 RX ORDER — NEOMYCIN SULFATE, POLYMYXIN B SULFATE, AND DEXAMETHASONE 3.5; 10000; 1 MG/G; [USP'U]/G; MG/G
OINTMENT OPHTHALMIC PRN
Status: DISCONTINUED | OUTPATIENT
Start: 2020-02-13 | End: 2020-02-13 | Stop reason: HOSPADM

## 2020-02-13 RX ORDER — MEPERIDINE HYDROCHLORIDE 25 MG/ML
12.5 INJECTION INTRAMUSCULAR; INTRAVENOUS; SUBCUTANEOUS
Status: DISCONTINUED | OUTPATIENT
Start: 2020-02-13 | End: 2020-02-14 | Stop reason: HOSPADM

## 2020-02-13 RX ORDER — FENTANYL CITRATE 50 UG/ML
INJECTION, SOLUTION INTRAMUSCULAR; INTRAVENOUS PRN
Status: DISCONTINUED | OUTPATIENT
Start: 2020-02-13 | End: 2020-02-13

## 2020-02-13 RX ORDER — ONDANSETRON 4 MG/1
4 TABLET, ORALLY DISINTEGRATING ORAL EVERY 30 MIN PRN
Status: DISCONTINUED | OUTPATIENT
Start: 2020-02-13 | End: 2020-02-14 | Stop reason: HOSPADM

## 2020-02-13 RX ORDER — ACETAMINOPHEN 325 MG/1
975 TABLET ORAL ONCE
Status: COMPLETED | OUTPATIENT
Start: 2020-02-13 | End: 2020-02-13

## 2020-02-13 RX ORDER — FENTANYL CITRATE 50 UG/ML
25-50 INJECTION, SOLUTION INTRAMUSCULAR; INTRAVENOUS
Status: DISCONTINUED | OUTPATIENT
Start: 2020-02-13 | End: 2020-02-13 | Stop reason: HOSPADM

## 2020-02-13 RX ORDER — GLYCOPYRROLATE 0.2 MG/ML
INJECTION, SOLUTION INTRAMUSCULAR; INTRAVENOUS PRN
Status: DISCONTINUED | OUTPATIENT
Start: 2020-02-13 | End: 2020-02-13

## 2020-02-13 RX ORDER — SODIUM CHLORIDE 9 MG/ML
INJECTION, SOLUTION INTRAVENOUS CONTINUOUS PRN
Status: DISCONTINUED | OUTPATIENT
Start: 2020-02-13 | End: 2020-02-13

## 2020-02-13 RX ORDER — OXYCODONE HYDROCHLORIDE 5 MG/1
5 TABLET ORAL EVERY 4 HOURS PRN
Status: DISCONTINUED | OUTPATIENT
Start: 2020-02-13 | End: 2020-02-14 | Stop reason: HOSPADM

## 2020-02-13 RX ORDER — DEXAMETHASONE SODIUM PHOSPHATE 4 MG/ML
INJECTION, SOLUTION INTRA-ARTICULAR; INTRALESIONAL; INTRAMUSCULAR; INTRAVENOUS; SOFT TISSUE PRN
Status: DISCONTINUED | OUTPATIENT
Start: 2020-02-13 | End: 2020-02-13 | Stop reason: HOSPADM

## 2020-02-13 RX ORDER — ONDANSETRON 2 MG/ML
4 INJECTION INTRAMUSCULAR; INTRAVENOUS EVERY 30 MIN PRN
Status: DISCONTINUED | OUTPATIENT
Start: 2020-02-13 | End: 2020-02-14 | Stop reason: HOSPADM

## 2020-02-13 RX ORDER — LIDOCAINE HYDROCHLORIDE 20 MG/ML
INJECTION, SOLUTION INFILTRATION; PERINEURAL PRN
Status: DISCONTINUED | OUTPATIENT
Start: 2020-02-13 | End: 2020-02-13

## 2020-02-13 RX ORDER — DEXAMETHASONE SODIUM PHOSPHATE 4 MG/ML
INJECTION, SOLUTION INTRA-ARTICULAR; INTRALESIONAL; INTRAMUSCULAR; INTRAVENOUS; SOFT TISSUE PRN
Status: DISCONTINUED | OUTPATIENT
Start: 2020-02-13 | End: 2020-02-13

## 2020-02-13 RX ORDER — BALANCED SALT SOLUTION 6.4; .75; .48; .3; 3.9; 1.7 MG/ML; MG/ML; MG/ML; MG/ML; MG/ML; MG/ML
SOLUTION OPHTHALMIC PRN
Status: DISCONTINUED | OUTPATIENT
Start: 2020-02-13 | End: 2020-02-13 | Stop reason: HOSPADM

## 2020-02-13 RX ADMIN — DEXAMETHASONE SODIUM PHOSPHATE 4 MG: 4 INJECTION, SOLUTION INTRA-ARTICULAR; INTRALESIONAL; INTRAMUSCULAR; INTRAVENOUS; SOFT TISSUE at 14:21

## 2020-02-13 RX ADMIN — GLYCOPYRROLATE 0.2 MG: 0.2 INJECTION, SOLUTION INTRAMUSCULAR; INTRAVENOUS at 14:24

## 2020-02-13 RX ADMIN — LIDOCAINE HYDROCHLORIDE 40 MG: 20 INJECTION, SOLUTION INFILTRATION; PERINEURAL at 14:27

## 2020-02-13 RX ADMIN — PROPOFOL 20 MG: 10 INJECTION, EMULSION INTRAVENOUS at 14:28

## 2020-02-13 RX ADMIN — SODIUM CHLORIDE: 9 INJECTION, SOLUTION INTRAVENOUS at 14:15

## 2020-02-13 RX ADMIN — FENTANYL CITRATE 25 MCG: 50 INJECTION, SOLUTION INTRAMUSCULAR; INTRAVENOUS at 15:06

## 2020-02-13 RX ADMIN — ACETAMINOPHEN 975 MG: 325 TABLET ORAL at 13:10

## 2020-02-13 RX ADMIN — ONDANSETRON 4 MG: 2 INJECTION INTRAMUSCULAR; INTRAVENOUS at 14:21

## 2020-02-13 RX ADMIN — PROPOFOL 20 MG: 10 INJECTION, EMULSION INTRAVENOUS at 14:27

## 2020-02-13 RX ADMIN — FENTANYL CITRATE 50 MCG: 50 INJECTION, SOLUTION INTRAMUSCULAR; INTRAVENOUS at 14:21

## 2020-02-13 NOTE — DISCHARGE INSTRUCTIONS
[facility]  Home Care Following Anesthesia  For 24 hours after surgery:  1. Get plenty of rest.  A responsible adult must stay with you for at least 24 hours after you leave the surgery center.  2. Do not drive or use heavy equipment.  If you have weakness or tingling, don't drive or use heavy equipment until this feeling goes away.   3. Do not drink alcohol.   4. Avoid strenuous or risky activities.  Ask for help when climbing stairs.  5. You may feel lightheaded.  IF so, sit for a few minutes before standing.  Have someone help you get up.   6. If you have nausea (feel sick to your stomach): Drink only clear liquids such as apple juice, ginger ale, broth or 7-Up.  Rest may also help.  Be sure to drink enough fluids.  Move to a regular diet as you feel able.   7. You may have a slight fever.  Call the doctor if your fever is over 100 F (37.7 C) (taken under the tongue) or lasts longer than 24 hours.  8. You may have a dry mouth, a sore throat, muscle aches or trouble sleeping. These should go away after 24 hours.  9. Do not make important or legal decisions.               Tips for taking pain medications  To get the best pain relief possible, remember these points:    Take pain medications as directed, before pain becomes severe.    Pain medication can upset your stomach: taking it with food may help.    Constipation is a common side effect of pain medication. Drink plenty of  fluids.    Eat foods high in fiber. Take a stool softener if recommended by your doctor or pharmacist.    Do not drink alcohol, drive or operate machinery while taking pain medications.    Ask about other ways to control pain, such as with heat, ice or relaxation.    Tylenol/Acetaminophen Consumption  To help encourage the safe use of acetaminophen, the makers of TYLENOL  have lowered the maximum daily dose for single-ingredient Extra Strength TYLENOL  (acetaminophen) products sold in the [place] from 8  pills per day (4,000 mg) to 6 pills per day (3,000 mg). The dosing interval has also changed from 2 pills every 4-6 hours to 2 pills every 6 hours.    If you feel your pain relief is insufficient, you may take Tylenol/Acetaminophen in addition to your narcotic pain medication.     Be careful not to exceed 3,000 mg of Tylenol/Acetaminophen in a 24 hour period from all sources.    If you are taking extra strength Tylenol/acetaminophen (500 mg), the maximum dose is 6 tablets in 24 hours.    If you are taking regular strength acetaminophen (325 mg), the maximum dose is 9 tablets in 24 hours.    Call a doctor for any of the followin. Signs of infection (fever, growing tenderness at the surgery site, a large amount of drainage or bleeding, severe pain, foul-smelling drainage, redness, swelling).  2. It has been over 8 to 10 hours since surgery and you are still not able to urinate (pass water).  3. Headache for over 24 hours.  4. Numbness, tingling or weakness the day after surgery (if you had spinal anesthesia).  Your doctor is:       Dr. Elizabeth Valdovinos, Opthalmology: [phone]               Or dial [phone] and ask for the resident on call for:  Ophthalmology  For emergency care, call the:  [facility]:  [phone] (TTY for hearing impaired: [phone])

## 2020-02-13 NOTE — ANESTHESIA POSTPROCEDURE EVALUATION
Anesthesia POST Procedure Evaluation    Patient: Krystian Ramos   MRN:     [id number] Gender:   female   Age:    74 year old :      1945        Preoperative Diagnosis: History of glaucoma tube shunt procedure [Z96.89]   Procedure(s):  REVISION, GLAUCOMA TUBE SHUNT WITH TUTOPLAST SCLERA   Postop Comments: No value filed.       Anesthesia Type:  Not documented  MAC    Reportable Event: NO     PAIN: Uncomplicated   Sign Out status: Comfortable, Well controlled pain     PONV: No PONV   Sign Out status:  No Nausea or Vomiting     Neuro/Psych: Uneventful perioperative course   Sign Out Status: Preoperative baseline; Age appropriate mentation     Airway/Resp.: Uneventful perioperative course   Sign Out Status: Non labored breathing, age appropriate RR; Resp. Status within EXPECTED Parameters     CV: Uneventful perioperative course   Sign Out status: Appropriate BP and perfusion indices; Appropriate HR/Rhythm     Disposition:   Sign Out in:  Phase II  Disposition:  Home  Recovery Course: Uneventful  Follow-Up: Not required           Last Anesthesia Record Vitals:  CRNA VITALS  2020 1448 - 2020 1548      2020             Resp Rate (set):  10          Last PACU Vitals:  Vitals Value Taken Time   BP     Temp     Pulse     Resp     SpO2     Temp src Available 2020  3:15 PM   NIBP 187/106 2020  3:16 PM   Pulse 84 2020  3:17 PM   SpO2 99 % 2020  3:17 PM   Resp     Temp     Ht Rate 81 2020  3:16 PM   Temp 2           Electronically Signed By: Deuce Francis MD, 2020, 3:48 PM

## 2020-02-13 NOTE — TELEPHONE ENCOUNTER
Called pt on home cell phone provided by Dr. Eaton's office, left messages on both voicemails. Needed to reschedule evaluation and surgery, left glaucoma desk call back information.     Pt cell number [phone]  Patti GRACIA 9:23 AM February 13, 2020

## 2020-02-13 NOTE — OP NOTE
PREOPERATIVE DIAGNOSES: Exposed tube right eye     POSTOPERATIVE DIAGNOSES: Same     PROCEDURE: Revision of Baerveldt implant with corneal patch graft, right eye.   SURGEON: Elizabeth Valdovinos MD   ASSISTANT: none.   ANESTHESIA: Monitored anesthesia with regional block.   COMPLICATIONS: none     PROCEDURE: The patient was brought to the operating room where a retrobulbar block was given. The right eye was prepped and draped in the usual sterile manner and a 7-0 Vicryl traction suture was placed in the superior temporal cornea. An incision was made through conjunctiva and Tenon's 6 mm posterior to the exposed tube and the dissection was carried forward to the limbus on either side of the tube and under the epitheliazation under the tube.  The tube was removed from the anterior chamber and replaced under the newly epithelialized area and back into the anterior chamber.The tube was covered with a Tutoplast sclera patch graft and conjunctiva was closed with a running 9-0 Vicryl suture on a vascular needle. An attempt was made to close the original defect in the conjunctiva but it could not be fully closed.  The wound was checked and found to be free from leaks. A paracentesis tract was made. Moxifloxicin was placed into the anterior chamber. The eye was patched and shielded with Maxitrol ointment and the patient left the operating room in good condition.

## 2020-02-13 NOTE — ANESTHESIA CARE TRANSFER NOTE
Patient: Krystian Ramos    Procedure(s):  REVISION, GLAUCOMA TUBE SHUNT WITH TUTOPLAST SCLERA    Diagnosis: History of glaucoma tube shunt procedure [Z96.89]  Diagnosis Additional Information: No value filed.    Anesthesia Type:   MAC     Note:  Airway :Room Air  Patient transferred to:Phase II  Comments: Uneventful transport to Phase II: VSS; IV patent; pt comfortable; Report given to RN; pt. Responds appropriately to commandsHandoff Report: Identifed the Patient, Identified the Reponsible Provider, Reviewed the pertinent medical history, Discussed the surgical course, Reviewed Intra-OP anesthesia mangement and issues during anesthesia, Set expectations for post-procedure period and Allowed opportunity for questions and acknowledgement of understanding      Vitals: (Last set prior to Anesthesia Care Transfer)    CRNA VITALS  2/13/2020 1444 - 2/13/2020 1514      2/13/2020             Pulse:  77    Ht Rate:  77    SpO2:  100 %    Resp Rate (set):  10                Electronically Signed By: ARABELLA Oreilly CRNA  February 13, 2020  3:14 PM

## 2020-02-13 NOTE — ANESTHESIA PREPROCEDURE EVALUATION
"Anesthesia Pre-Procedure Evaluation    Patient: Krystian Ramos   MRN:     [id number] Gender:   female   Age:    74 year old :      1945        Preoperative Diagnosis: History of glaucoma tube shunt procedure [Z96.89]   Procedure(s):  REVISION, GLAUCOMA TUBE SHUNT WITH TUTOPLAST SCLERA     LABS:  CBC: No results found for: WBC, HGB, HCT, PLT  BMP: No results found for: NA, POTASSIUM, CHLORIDE, CO2, BUN, CR, GLC  COAGS: No results found for: PTT, INR, FIBR  POC:   Lab Results   Component Value Date     (H) 2020     OTHER: No results found for: PH, LACT, A1C, ML, PHOS, MAG, ALBUMIN, PROTTOTAL, ALT, AST, GGT, ALKPHOS, BILITOTAL, BILIDIRECT, LIPASE, AMYLASE, JOVON, TSH, T4, T3, CRP, SED     Preop Vitals    BP Readings from Last 3 Encounters:   20 (!) 160/79   19 130/75    Pulse Readings from Last 3 Encounters:   20 59   19 62      Resp Readings from Last 3 Encounters:   20 16   19 16    SpO2 Readings from Last 3 Encounters:   20 95%   19 94%      Temp Readings from Last 1 Encounters:   20 36.4  C (97.6  F) (Temporal)    Ht Readings from Last 1 Encounters:   19 1.626 m (5' 4\")      Wt Readings from Last 1 Encounters:   20 85.3 kg (188 lb)    Estimated body mass index is 32.27 kg/m  as calculated from the following:    Height as of 19: 1.626 m (5' 4\").    Weight as of this encounter: 85.3 kg (188 lb).     LDA:  Peripheral IV 20 Right Hand (Active)   Site Assessment WDL 2020  1:02 PM   Line Status Infusing 2020  1:02 PM   Phlebitis Scale 0-->no symptoms 2020  1:02 PM   Infiltration Scale 0 2020  1:02 PM   Extravasation? No 2020  1:02 PM   Dressing Intervention New dressing  2020  1:02 PM   Number of days: 0        Past Medical History:   Diagnosis Date     Coronary artery disease 2016    x1 coronary stent      Diabetes (H)      Glaucoma     per pt report     History of coronary artery bypass graft " 2015    x3 vessels     Hypercholesteremia      Hypertension      Macular degeneration      Stroke (H) 2006      Past Surgical History:   Procedure Laterality Date     APPENDECTOMY       CARDIAC SURGERY      3V CABG     CATARACT IOL, RT/LT Bilateral      GENITOURINARY SURGERY  2018    Bladder sling surgery     GYN SURGERY      TUBAL LIGATION     HC TRABECULOPLASTY BY LASER SURGERY Right      IMPLANT VALVE EYE Right 4/25/2019    Procedure: RIGHT EYE BAERVELDT TUBE WITH CORNEAL PATCH GRAFT;  Surgeon: Elizabeth Valdovinos MD;  Location:  [facility]     ORTHOPEDIC SURGERY Right     TKA     SHOULDER SURGERY        Allergies   Allergen Reactions     Ciprofloxacin Other (See Comments)     Lisinopril Cough     Pramipexole Other (See Comments)     Causes hypotension/dizziness     Raloxifene Rash             JZG [facility] AN PHYSICAL EXAM    Assessment:   ASA SCORE: 3    H&P: History and physical reviewed and following examination; no interval change.    NPO Status: NPO Appropriate     Plan:   Anes. Type:  MAC   Pre-Medication: None   Induction:  N/a   Airway: Native Airway   Access/Monitoring: PIV   Maintenance: N/a     Postop Plan:   Postop Pain: None  Postop Sedation/Airway: Not planned  Disposition: Outpatient     PONV Management: Adult Risk Factors: Female   Prevention: Ondansetron, Dexamethasone     CONSENT: Direct conversation   Plan and risks discussed with: Patient                      Deuce Francis MD     ANESTHESIA PREOP EVALUATION    PROCEDURE: Procedure(s):  REVISION, GLAUCOMA TUBE SHUNT WITH TUTOPLAST SCLERA    HPI: Krystian Ramos is a 74 year old female who presents for above procedure 2/2 glaucoma.     PAST MEDICAL HISTORY:    Past Medical History:   Diagnosis Date     Coronary artery disease 2016    x1 coronary stent      Diabetes (H)      Glaucoma     per pt report     History of coronary artery bypass graft 2015    x3 vessels     Hypercholesteremia      Hypertension      Macular degeneration      Stroke (H) 2006       PAST  SURGICAL HISTORY:    Past Surgical History:   Procedure Laterality Date     APPENDECTOMY       CARDIAC SURGERY      3V CABG     CATARACT IOL, RT/LT Bilateral      GENITOURINARY SURGERY  2018    Bladder sling surgery     GYN SURGERY      TUBAL LIGATION     HC TRABECULOPLASTY BY LASER SURGERY Right      IMPLANT VALVE EYE Right 4/25/2019    Procedure: RIGHT EYE BAERVELDT TUBE WITH CORNEAL PATCH GRAFT;  Surgeon: Elizabeth Valdovinos MD;  [facility]     ORTHOPEDIC SURGERY Right     TKA     SHOULDER SURGERY         SOCIAL HISTORY:       Social History     Tobacco Use     Smoking status: Never Smoker     Smokeless tobacco: Never Used   Substance Use Topics     Alcohol use: Yes     Comment: very rare       ALLERGIES:     Allergies   Allergen Reactions     Ciprofloxacin Other (See Comments)     Lisinopril Cough     Pramipexole Other (See Comments)     Causes hypotension/dizziness     Raloxifene Rash       MEDICATIONS:     (Not in a hospital admission)      Current Outpatient Medications   Medication Sig Dispense Refill     ammonium lactate (AMLACTIN) 12 % external cream Apply to affected area 2 times a day       aspirin (ASA) 81 MG chewable tablet Take 81 mg by mouth       baclofen (LIORESAL) 10 MG tablet See Instructions, 90, 2, 3, 5, take 5mg P.O every morning and afternoon; take 10-15mg P.O at HS       bimatoprost (LUMIGAN) 0.01 % SOLN 1 drop       calcium carbonate-vitamin D 600-125 MG-UNIT TABS Take 1 tablet by mouth       cholecalciferol 1000 units TABS Take 1 tablet by mouth       gabapentin (NEURONTIN) 100 MG capsule TAKE 1-3 CAPS IN MORNING AS NEEDED FOR BREAKTHROUGH PAIN.       hydrochlorothiazide (HYDRODIURIL) 25 MG tablet Take 25 mg by mouth       HYDROcodone-acetaminophen (NORCO) 5-325 MG tablet Take 1 tablet by mouth       losartan (COZAAR) 50 MG tablet        Multiple Vitamins-Minerals (ICAPS AREDS 2 PO) Take 1 capsule by mouth       netarsudil dimesylate (RHOPRESSA) 0.02 % SOLN 1 drop       atorvastatin  (LIPITOR) 40 MG tablet Take 40 mg by mouth       blood glucose monitoring (ACCU-CHEK JEFFREY PLUS) meter device kit Test 2 times a day Plus when you feel hypoglycemia From 12/7/2018 until 12/12/2019       blood glucose monitoring (ACCU-CHEK FASTCLIX) lancets 2 times a day Plus when you feel hypoglycemia From 12/7/2018 until 12/12/2019       brimonidine-timolol (COMBIGAN) 0.2-0.5 % ophthalmic solution INSTILL 1 DROP INTO BOTH EYES TWICE A DAY       cephALEXin (KEFLEX) 500 MG capsule Take 500 mg by mouth       clopidogrel (PLAVIX) 75 MG tablet TAKE 1 TABLET (75 MG) BY MOUTH 1 TIME PER DAY  0     clotrimazole (LOTRIMIN) 1 % external cream        fluconazole (DIFLUCAN) 150 MG tablet TAKE 1 TABLET (150 MG) BY MOUTH 1 TIME PER DAY, MAY REPEAT AGAIN IN 72 HOURS IF SYMPTOMS PERSIST.  0     hydrocortisone (CORTAID) 1 % external cream        isosorbide mononitrate (IMDUR) 30 MG 24 hr tablet Take 30 mg by mouth       metFORMIN (GLUCOPHAGE) 500 MG tablet Take 500 mg by mouth       nitroFURantoin macrocrystal-monohydrate (MACROBID) 100 MG capsule TAKE 1 CAPSULE BY MOUTH ONCE DAILY       nitroFURantoin macrocrystal-monohydrate (MACROBID) 100 MG capsule TAKE 1 CAPSULE BY MOUTH ONCE DAILY  3     nitroGLYcerin (NITROSTAT) 0.4 MG sublingual tablet Place 0.4 mg under the tongue       nystatin (MYCOSTATIN) 283056 UNIT/GM external ointment 1 Application       omeprazole (PRILOSEC) 20 MG DR capsule Take 20 mg by mouth       prednisoLONE acetate (PRED FORTE) 1 % ophthalmic suspension Place 1 drop into the right eye 4 times daily 5 mL 3     pregabalin (LYRICA) 75 MG capsule Take 75 mg by mouth       rOPINIRole (REQUIP) 0.5 MG tablet TAKE 1 TABLET BY MOUTH AT BEDTIME; IF NOT EFFECTIVE INCREASE TO 1 TABLET AT 6PM AND 1 TABLET AT BEDTIME  1       Current Outpatient Medications Ordered in Epic   Medication Sig Dispense Refill     ammonium lactate (AMLACTIN) 12 % external cream Apply to affected area 2 times a day       aspirin (ASA) 81 MG  chewable tablet Take 81 mg by mouth       baclofen (LIORESAL) 10 MG tablet See Instructions, 90, 2, 3, 5, take 5mg P.O every morning and afternoon; take 10-15mg P.O at HS       bimatoprost (LUMIGAN) 0.01 % SOLN 1 drop       calcium carbonate-vitamin D 600-125 MG-UNIT TABS Take 1 tablet by mouth       cholecalciferol 1000 units TABS Take 1 tablet by mouth       gabapentin (NEURONTIN) 100 MG capsule TAKE 1-3 CAPS IN MORNING AS NEEDED FOR BREAKTHROUGH PAIN.       hydrochlorothiazide (HYDRODIURIL) 25 MG tablet Take 25 mg by mouth       HYDROcodone-acetaminophen (NORCO) 5-325 MG tablet Take 1 tablet by mouth       losartan (COZAAR) 50 MG tablet        Multiple Vitamins-Minerals (ICAPS AREDS 2 PO) Take 1 capsule by mouth       netarsudil dimesylate (RHOPRESSA) 0.02 % SOLN 1 drop       atorvastatin (LIPITOR) 40 MG tablet Take 40 mg by mouth       blood glucose monitoring (ACCU-CHEK JEFFREY PLUS) meter device kit Test 2 times a day Plus when you feel hypoglycemia From 12/7/2018 until 12/12/2019       blood glucose monitoring (ACCU-CHEK FASTCLIX) lancets 2 times a day Plus when you feel hypoglycemia From 12/7/2018 until 12/12/2019       brimonidine-timolol (COMBIGAN) 0.2-0.5 % ophthalmic solution INSTILL 1 DROP INTO BOTH EYES TWICE A DAY       cephALEXin (KEFLEX) 500 MG capsule Take 500 mg by mouth       clopidogrel (PLAVIX) 75 MG tablet TAKE 1 TABLET (75 MG) BY MOUTH 1 TIME PER DAY  0     clotrimazole (LOTRIMIN) 1 % external cream        fluconazole (DIFLUCAN) 150 MG tablet TAKE 1 TABLET (150 MG) BY MOUTH 1 TIME PER DAY, MAY REPEAT AGAIN IN 72 HOURS IF SYMPTOMS PERSIST.  0     hydrocortisone (CORTAID) 1 % external cream        isosorbide mononitrate (IMDUR) 30 MG 24 hr tablet Take 30 mg by mouth       metFORMIN (GLUCOPHAGE) 500 MG tablet Take 500 mg by mouth       nitroFURantoin macrocrystal-monohydrate (MACROBID) 100 MG capsule TAKE 1 CAPSULE BY MOUTH ONCE DAILY       nitroFURantoin macrocrystal-monohydrate (MACROBID) 100 MG  capsule TAKE 1 CAPSULE BY MOUTH ONCE DAILY  3     nitroGLYcerin (NITROSTAT) 0.4 MG sublingual tablet Place 0.4 mg under the tongue       nystatin (MYCOSTATIN) 902250 UNIT/GM external ointment 1 Application       omeprazole (PRILOSEC) 20 MG DR capsule Take 20 mg by mouth       prednisoLONE acetate (PRED FORTE) 1 % ophthalmic suspension Place 1 drop into the right eye 4 times daily 5 mL 3     pregabalin (LYRICA) 75 MG capsule Take 75 mg by mouth       rOPINIRole (REQUIP) 0.5 MG tablet TAKE 1 TABLET BY MOUTH AT BEDTIME; IF NOT EFFECTIVE INCREASE TO 1 TABLET AT 6PM AND 1 TABLET AT BEDTIME  1     Current Facility-Administered Medications Ordered in Epic   Medication Dose Route Frequency Provider Last Rate Last Dose     bupivacaine 0.75% (pf) 4.5mL + lidocaine 2% (pf) 4.5mL + hyaluronidase (HYLENEX) 150 units   Retrobulbar Once Elizabeth Valdovinos MD         lactated ringers infusion  500 mL Intravenous Continuous Deuce Francis MD         lidocaine (LMX4) kit   Topical Q1H PRN Deuce Francis MD         lidocaine 1 % 0.1-1 mL  0.1-1 mL Other Q1H PRN Deuce Francis MD         sodium chloride (PF) 0.9% PF flush 3 mL  3 mL Intracatheter q1 min prn Deuce Francis MD         sodium chloride (PF) 0.9% PF flush 3 mL  3 mL Intracatheter Q8H Deuce Francis MD           PHYSICAL EXAM:    Vitals: T 97.6, P 59, /79, R 16, SpO2 95%, Weight   Wt Readings from Last 2 Encounters:   02/13/20 85.3 kg (188 lb)   04/25/19 81.6 kg (180 lb)       See doc flowsheet    NPO STATUS: see doc flowsheet    LABS:    BMP:  No results for input(s): NA, POTASSIUM, CHLORIDE, CO2, BUN, CR, GLC, ML in the last 72805 hours.    LFTs:   No results for input(s): PROTTOTAL, ALBUMIN, BILITOTAL, ALKPHOS, AST, ALT, BILIDIRECT in the last 11872 hours.    CBC:   No results for input(s): WBC, RBC, HGB, HCT, MCV, MCH, MCHC, RDW, PLT in the last 70894 hours.    Coags:  No results for input(s): INR, PTT, FIBR in the last 51863 hours.    Imaging:  No orders to  display       Deuce Francis MD  Anesthesiology Staff  Pager [phone]    2/13/2020  1:11 PM

## 2020-02-14 ENCOUNTER — OFFICE VISIT (OUTPATIENT)
Dept: OPHTHALMOLOGY | Facility: CLINIC | Age: 75
End: 2020-02-14
Attending: OPHTHALMOLOGY
Payer: COMMERCIAL

## 2020-02-14 DIAGNOSIS — T85.9XXA EXPOSURE OF OCULAR IMPLANT: ICD-10-CM

## 2020-02-14 DIAGNOSIS — Z48.810 AFTERCARE FOLLOWING SURGERY OF A SENSORY ORGAN: Primary | ICD-10-CM

## 2020-02-14 PROCEDURE — G0463 HOSPITAL OUTPT CLINIC VISIT: HCPCS | Mod: ZF

## 2020-02-14 RX ORDER — FLUOROMETHOLONE 0.1 %
1 SUSPENSION, DROPS(FINAL DOSAGE FORM)(ML) OPHTHALMIC (EYE) 4 TIMES DAILY
Qty: 5 ML | Refills: 0 | Status: SHIPPED | OUTPATIENT
Start: 2020-02-14

## 2020-02-14 RX ORDER — MOXIFLOXACIN 5 MG/ML
1 SOLUTION/ DROPS OPHTHALMIC 4 TIMES DAILY
COMMUNITY
Start: 2020-02-11

## 2020-02-14 ASSESSMENT — VISUAL ACUITY
OS_SC: 20/70
OD_PH_SC+: -1
OD_SC: 20/200
METHOD: SNELLEN - LINEAR
OS_SC+: -1
OD_PH_SC: 20/80
OS_PH_SC: 20/40

## 2020-02-14 ASSESSMENT — SLIT LAMP EXAM - LIDS
COMMENTS: NORMAL
COMMENTS: NORMAL

## 2020-02-14 ASSESSMENT — TONOMETRY
OS_IOP_MMHG: 31
OD_IOP_MMHG: 6
IOP_METHOD: TONOPEN

## 2020-02-14 NOTE — PROGRESS NOTES
CC: Referred by Dr Eaton.  Postoperative day #1 repair of exposed tube with scleral graft right eye 2/13/20     HPI: Episodes of high intraocular pressure right eye over the past couple of years.  Had intraocular pressure spike with a recent Avastin injection.  On maximum medications.   Left eye has been OK     PAST OCULAR HISTORY/PROCEDURES:  Baerveldt with corneal patch graft right eye 4/25/19  Cataract extraction both eyes   Injections for age related macular degeneration right eye   Selective laser trabeculoplasty (SLT) right eye with intraocular pressure spike after that (steroid responder)    MAXIMUM INTRAOCULAR PRESSURE:  Over 60    MEDICATIONS:  Lumigan at bedtime both eyes   Combigan two times a day both eyes   Rhopressa at bedtime right eye   Moxifloxacin four times a day right eye for 1 week, then stop  artificial tears as needed     ANTICOAGULANTS/ANTIPLATELETS/FLOMAX: baby aspirin daily    PMH:  Systemic hypertension  Diabetes mellitus   Cardiac disease  Stroke    FAMILY/SOCIAL HISTORY:      + glaucoma mother, father and son      TESTING Previously:  Octopus visual field 24-2   Right eye with decreased MD, defect both hemifields, and poor reliability   Left eye with good reliability and inferior arcuate    OCT retinal nerve fiber layer    Right eye diffuse thinning   Left eye superior thinning    EXAM:  Pallor of optic nerve right eye   Angles open    IMPRESSION:  Primary open angle glaucoma (POAG)    Adv right eye    Mod left eye  Pseudophakia  Age related macular degeneration requiring injections q 5 weeks right eye   Intraocular pressure high left eye today because she did not take her glaucoma medications for the past 24 hours-restart    PLAN:  OK to have Avastin in a couple of weeks    Postoperative instructions and sheet given including no bending, no lifting more than 10 pounds  Continue all current glaucoma drops both eyes and Moxifloxacin for 1 week four times a day for 1 week  I would prefer  minimal steroids to enhance healing but I did give her an prescription for FML four times a day if her eye gets sore  NO RUBBING EYE    See Angelito next week ([place])      Attending Physician Attestation:  Complete documentation of historical and exam elements from today's encounter can be found in the full encounter summary report (not reduplicated in this progress note). I personally obtained the chief complaint(s) and history of present illness. I confirmed and edited asnecessary the review of systems, past medical/surgical history, family history, social history, and examination findings as documented by others; and I examined the patient myself. I personally reviewed the relevant tests, images, and reports as documented above. I formulated and edited as necessary the assessment and plan and discussed the findings and management plan with the patient and family.  - Elizabeth Valdovinos MD 11:52 AM 2/14/2020

## 2020-02-14 NOTE — LETTER
2/14/2020    Dear Dr. Eaton:    I had the pleasure of seeing Krystian Ramos on February 14, 2020 at the [facility].  My exam findings are as follows:      Visual Acuity (Snellen - Linear)       Right Left    Dist sc 20/200 20/70 -1    Dist ph sc 20/80 -1 20/40         Tonometry (Tonopen, 10:31 AM)       Right Left    Pressure 6 31   He RUL was very sore, so I could not hold her RE open, and she kept closing her RE. Had to use tonopen        Main Ophthalmology Exam     Slit Lamp Exam       Right Left    Lids/Lashes Normal Normal    Conjunctiva/Sclera tube covered with scleral graft, central area not covered by conjunctiva White and quiet    Cornea Clear Clear    Anterior Chamber trace to 1+ Cell Deep and quiet    Iris Round and reactive Round and reactive    Lens Posterior chamber intraocular lens Posterior chamber intraocular lens                  My assessment and plan is:  CC: Referred by Dr Eaton.  Postoperative day #1 repair of exposed tube with scleral graft right eye 2/13/20     HPI: Episodes of high intraocular pressure right eye over the past couple of years.  Had intraocular pressure spike with a recent Avastin injection.  On maximum medications.   Left eye has been OK     PAST OCULAR HISTORY/PROCEDURES:  Baerveldt with corneal patch graft right eye 4/25/19  Cataract extraction both eyes   Injections for age related macular degeneration right eye   Selective laser trabeculoplasty (SLT) right eye with intraocular pressure spike after that (steroid responder)    MAXIMUM INTRAOCULAR PRESSURE:  Over 60    MEDICATIONS:  Lumigan at bedtime both eyes   Combigan two times a day both eyes   Rhopressa at bedtime right eye   Moxifloxacin four times a day right eye for 1 week, then stop  artificial tears as needed     ANTICOAGULANTS/ANTIPLATELETS/FLOMAX: baby aspirin daily    PMH:  Systemic hypertension  Diabetes mellitus   Cardiac disease  Stroke    FAMILY/SOCIAL HISTORY:      + glaucoma mother, father and  son      TESTING Previously:  Octopus visual field 24-2   Right eye with decreased MD, defect both hemifields, and poor reliability   Left eye with good reliability and inferior arcuate    OCT retinal nerve fiber layer    Right eye diffuse thinning   Left eye superior thinning    EXAM:  Pallor of optic nerve right eye   Angles open    IMPRESSION:  Primary open angle glaucoma (POAG)    Adv right eye    Mod left eye  Pseudophakia  Age related macular degeneration requiring injections q 5 weeks right eye   Intraocular pressure high left eye today because she did not take her glaucoma medications for the past 24 hours-restart    PLAN:  OK to have Avastin in a couple of weeks    Postoperative instructions and sheet given including no bending, no lifting more than 10 pounds  Continue all current glaucoma drops both eyes and Moxifloxacin for 1 week four times a day for 1 week  I would prefer minimal steroids to enhance healing but I did give her an prescription for FML four times a day if her eye gets sore  NO RUBBING EYE    See Angelito next week ([place])    Attending Physician Attestation:  Complete documentation of historical and exam elements from today's encounter can be found in the full encounter summary report (not reduplicated in this progress note). I personally obtained the chief complaint(s) and history of present illness. I confirmed and edited asnecessary the review of systems, past medical/surgical history, family history, social history, and examination findings as documented by others; and I examined the patient myself. I personally reviewed the relevant tests, images, and reports as documented above. I formulated and edited as necessary the assessment and plan and discussed the findings and management plan with the patient and family.  - Elizabeth Valdovinos MD 11:52 AM 2/14/2020    Sincerely,     Elizabeth Valdovinos MD  Glaucoma   [employer]

## 2020-02-14 NOTE — NURSING NOTE
Chief Complaints and History of Present Illnesses   Patient presents with     Post Op (Ophthalmology) Right Eye     Chief Complaint(s) and History of Present Illness(es)     Post Op (Ophthalmology) Right Eye     Laterality: right eye              Comments     Krystian is here 1 day post REVISION of exposed Baerveldt TUBE with tutoplast sclera, right eye. She says she did not sleep well last night due to the room being too warm. Today she says RE feels a little scratchy.     Kamari Arcos COT 10:15 AM February 14, 2020

## 2020-02-19 ENCOUNTER — TRANSFERRED RECORDS (OUTPATIENT)
Dept: HEALTH INFORMATION MANAGEMENT | Facility: CLINIC | Age: 75
End: 2020-02-19

## 2020-04-01 ENCOUNTER — TRANSFERRED RECORDS (OUTPATIENT)
Dept: HEALTH INFORMATION MANAGEMENT | Facility: CLINIC | Age: 75
End: 2020-04-01

## 2020-04-03 ENCOUNTER — TELEPHONE (OUTPATIENT)
Dept: OPHTHALMOLOGY | Facility: CLINIC | Age: 75
End: 2020-04-03

## 2020-04-03 NOTE — TELEPHONE ENCOUNTER
"Called pt per inbasket message to set up potential see and do.   RE, some pain, feels the same as last time tube was exposed. Laying down is difficult, walking around makes eye \"throb.\"   Per Dr. Gamboa, pt to start Ofloxacin 4 times daily, will send Rx to pt's local pharmacy. Gave pt glaucoma desk callback info and will be in touch with dates/times. Pt agreed with the above plan and will call with any questions.   Patti Rubio COA 11:35 AM April 3, 2020     "

## 2020-04-06 ENCOUNTER — TELEPHONE (OUTPATIENT)
Dept: OPHTHALMOLOGY | Facility: CLINIC | Age: 75
End: 2020-04-06

## 2020-04-06 PROBLEM — T85.9XXA: Status: ACTIVE | Noted: 2020-04-06

## 2020-04-06 RX ORDER — MOXIFLOXACIN 5 MG/ML
1 SOLUTION/ DROPS OPHTHALMIC 3 TIMES DAILY
Qty: 3 ML | Refills: 3 | Status: SHIPPED | OUTPATIENT
Start: 2020-04-06

## 2020-04-06 NOTE — TELEPHONE ENCOUNTER
Spoke with patient to schedule surgery with Dr. Gamboa.    Surgery was scheduled on 4/14/2020 at [facility]  Patient will have H&P at [facility] in [place], [place].   Patient is aware a / is needed day of surgery.   Surgery packet was emailed.   Patient has my direct contact information for any further questions.

## 2020-04-07 ENCOUNTER — TELEPHONE (OUTPATIENT)
Dept: OPHTHALMOLOGY | Facility: CLINIC | Age: 75
End: 2020-04-07

## 2020-04-07 NOTE — TELEPHONE ENCOUNTER
Patient called and left a message in regards to how his surgery packet was e-mailed to him.   Patient would like to receive his surgery packet by e-mail in a format that he can read.

## 2020-04-09 NOTE — TELEPHONE ENCOUNTER
Patient called and requested information about when, where, and the time of her surgery.   Patient was advised that her surgery was on 4/14/2020 with an arrival time of 7:30am. Patient was advised that her surgery was at the [facility] location.   Patient was advised to call [phone] if she had any further questions or concerns.

## 2020-04-13 ENCOUNTER — ANESTHESIA EVENT (OUTPATIENT)
Dept: SURGERY | Facility: CLINIC | Age: 75
End: 2020-04-13
Payer: COMMERCIAL

## 2020-04-14 ENCOUNTER — OFFICE VISIT (OUTPATIENT)
Dept: OPHTHALMOLOGY | Facility: CLINIC | Age: 75
End: 2020-04-14
Attending: OPHTHALMOLOGY
Payer: COMMERCIAL

## 2020-04-14 ENCOUNTER — TELEPHONE (OUTPATIENT)
Dept: OPHTHALMOLOGY | Facility: CLINIC | Age: 75
End: 2020-04-14

## 2020-04-14 ENCOUNTER — ANESTHESIA (OUTPATIENT)
Dept: SURGERY | Facility: CLINIC | Age: 75
End: 2020-04-14
Payer: COMMERCIAL

## 2020-04-14 ENCOUNTER — HOSPITAL ENCOUNTER (OUTPATIENT)
Facility: CLINIC | Age: 75
Discharge: HOME | End: 2020-04-14
Attending: OPHTHALMOLOGY | Admitting: OPHTHALMOLOGY
Payer: COMMERCIAL

## 2020-04-14 VITALS
BODY MASS INDEX: 31.01 KG/M2 | OXYGEN SATURATION: 97 % | WEIGHT: 181.66 LBS | SYSTOLIC BLOOD PRESSURE: 161 MMHG | HEIGHT: 64 IN | TEMPERATURE: 98.2 F | RESPIRATION RATE: 14 BRPM | DIASTOLIC BLOOD PRESSURE: 84 MMHG

## 2020-04-14 DIAGNOSIS — T85.9XXA EXPOSURE OF OCULAR IMPLANT: Primary | ICD-10-CM

## 2020-04-14 DIAGNOSIS — H40.1133 PRIMARY OPEN ANGLE GLAUCOMA (POAG) OF BOTH EYES, SEVERE STAGE: ICD-10-CM

## 2020-04-14 DIAGNOSIS — Z96.1 PSEUDOPHAKIA OF BOTH EYES: ICD-10-CM

## 2020-04-14 LAB
GLUCOSE BLDC GLUCOMTR-MCNC: 147 MG/DL (ref 70–99)
GLUCOSE SERPL-MCNC: 135 MG/DL (ref 70–99)
POTASSIUM SERPL-SCNC: 4.1 MMOL/L (ref 3.4–5.3)

## 2020-04-14 PROCEDURE — 36000053 ZZH SURGERY LEVEL 2 EA 15 ADDTL MIN - UMMC: Performed by: OPHTHALMOLOGY

## 2020-04-14 PROCEDURE — 37000009 ZZH ANESTHESIA TECHNICAL FEE, EACH ADDTL 15 MIN: Performed by: OPHTHALMOLOGY

## 2020-04-14 PROCEDURE — C1762 CONN TISS, HUMAN(INC FASCIA): HCPCS | Performed by: OPHTHALMOLOGY

## 2020-04-14 PROCEDURE — 25000132 ZZH RX MED GY IP 250 OP 250 PS 637: Performed by: ANESTHESIOLOGY

## 2020-04-14 PROCEDURE — 37000008 ZZH ANESTHESIA TECHNICAL FEE, 1ST 30 MIN: Performed by: OPHTHALMOLOGY

## 2020-04-14 PROCEDURE — 25000125 ZZHC RX 250: Performed by: NURSE ANESTHETIST, CERTIFIED REGISTERED

## 2020-04-14 PROCEDURE — 82962 GLUCOSE BLOOD TEST: CPT

## 2020-04-14 PROCEDURE — 82947 ASSAY GLUCOSE BLOOD QUANT: CPT | Mod: 91 | Performed by: ANESTHESIOLOGY

## 2020-04-14 PROCEDURE — 25000128 H RX IP 250 OP 636: Performed by: NURSE ANESTHETIST, CERTIFIED REGISTERED

## 2020-04-14 PROCEDURE — 25800030 ZZH RX IP 258 OP 636: Performed by: ANESTHESIOLOGY

## 2020-04-14 PROCEDURE — G0463 HOSPITAL OUTPT CLINIC VISIT: HCPCS | Mod: ZF

## 2020-04-14 PROCEDURE — 36000051 ZZH SURGERY LEVEL 2 1ST 30 MIN - UMMC: Performed by: OPHTHALMOLOGY

## 2020-04-14 PROCEDURE — 25000128 H RX IP 250 OP 636: Performed by: OPHTHALMOLOGY

## 2020-04-14 PROCEDURE — C1783 OCULAR IMP, AQUEOUS DRAIN DE: HCPCS | Performed by: OPHTHALMOLOGY

## 2020-04-14 PROCEDURE — 84132 ASSAY OF SERUM POTASSIUM: CPT | Performed by: ANESTHESIOLOGY

## 2020-04-14 PROCEDURE — 27210794 ZZH OR GENERAL SUPPLY STERILE: Performed by: OPHTHALMOLOGY

## 2020-04-14 PROCEDURE — 40000170 ZZH STATISTIC PRE-PROCEDURE ASSESSMENT II: Performed by: OPHTHALMOLOGY

## 2020-04-14 PROCEDURE — 71000027 ZZH RECOVERY PHASE 2 EACH 15 MINS: Performed by: OPHTHALMOLOGY

## 2020-04-14 PROCEDURE — 25000125 ZZHC RX 250: Performed by: OPHTHALMOLOGY

## 2020-04-14 DEVICE — IMPLANTABLE DEVICE: Type: IMPLANTABLE DEVICE | Site: EYE | Status: FUNCTIONAL

## 2020-04-14 DEVICE — EYE IMP TUTOPLAST SCLERA .5X.8CM 68333: Type: IMPLANTABLE DEVICE | Site: EYE | Status: FUNCTIONAL

## 2020-04-14 RX ORDER — ACETAMINOPHEN 325 MG/1
650 TABLET ORAL EVERY 4 HOURS PRN
Status: DISCONTINUED | OUTPATIENT
Start: 2020-04-14 | End: 2020-04-17 | Stop reason: HOSPADM

## 2020-04-14 RX ORDER — PREDNISOLONE ACETATE 10 MG/ML
1 SUSPENSION/ DROPS OPHTHALMIC 2 TIMES DAILY
Qty: 5 ML | Refills: 0 | Status: SHIPPED | OUTPATIENT
Start: 2020-04-14

## 2020-04-14 RX ORDER — ONDANSETRON 4 MG/1
4 TABLET, ORALLY DISINTEGRATING ORAL EVERY 30 MIN PRN
Status: DISCONTINUED | OUTPATIENT
Start: 2020-04-14 | End: 2020-04-17 | Stop reason: HOSPADM

## 2020-04-14 RX ORDER — PROPOFOL 10 MG/ML
INJECTION, EMULSION INTRAVENOUS PRN
Status: DISCONTINUED | OUTPATIENT
Start: 2020-04-14 | End: 2020-04-14

## 2020-04-14 RX ORDER — ONDANSETRON 2 MG/ML
4 INJECTION INTRAMUSCULAR; INTRAVENOUS EVERY 30 MIN PRN
Status: DISCONTINUED | OUTPATIENT
Start: 2020-04-14 | End: 2020-04-17 | Stop reason: HOSPADM

## 2020-04-14 RX ORDER — FENTANYL CITRATE 50 UG/ML
25-50 INJECTION, SOLUTION INTRAMUSCULAR; INTRAVENOUS
Status: DISCONTINUED | OUTPATIENT
Start: 2020-04-14 | End: 2020-04-17 | Stop reason: HOSPADM

## 2020-04-14 RX ORDER — NALOXONE HYDROCHLORIDE 0.4 MG/ML
.1-.4 INJECTION, SOLUTION INTRAMUSCULAR; INTRAVENOUS; SUBCUTANEOUS
Status: DISCONTINUED | OUTPATIENT
Start: 2020-04-14 | End: 2020-04-15 | Stop reason: HOSPADM

## 2020-04-14 RX ORDER — MEPERIDINE HYDROCHLORIDE 25 MG/ML
12.5 INJECTION INTRAMUSCULAR; INTRAVENOUS; SUBCUTANEOUS
Status: DISCONTINUED | OUTPATIENT
Start: 2020-04-14 | End: 2020-04-17 | Stop reason: HOSPADM

## 2020-04-14 RX ORDER — LIDOCAINE HYDROCHLORIDE 20 MG/ML
INJECTION, SOLUTION INFILTRATION; PERINEURAL PRN
Status: DISCONTINUED | OUTPATIENT
Start: 2020-04-14 | End: 2020-04-14

## 2020-04-14 RX ORDER — SODIUM CHLORIDE, SODIUM LACTATE, POTASSIUM CHLORIDE, CALCIUM CHLORIDE 600; 310; 30; 20 MG/100ML; MG/100ML; MG/100ML; MG/100ML
INJECTION, SOLUTION INTRAVENOUS CONTINUOUS
Status: DISCONTINUED | OUTPATIENT
Start: 2020-04-14 | End: 2020-04-17 | Stop reason: HOSPADM

## 2020-04-14 RX ORDER — MOXIFLOXACIN 5 MG/ML
1 SOLUTION/ DROPS OPHTHALMIC
Status: COMPLETED | OUTPATIENT
Start: 2020-04-14 | End: 2020-04-14

## 2020-04-14 RX ORDER — FENTANYL CITRATE 50 UG/ML
INJECTION, SOLUTION INTRAMUSCULAR; INTRAVENOUS PRN
Status: DISCONTINUED | OUTPATIENT
Start: 2020-04-14 | End: 2020-04-14

## 2020-04-14 RX ORDER — BALANCED SALT SOLUTION 6.4; .75; .48; .3; 3.9; 1.7 MG/ML; MG/ML; MG/ML; MG/ML; MG/ML; MG/ML
SOLUTION OPHTHALMIC PRN
Status: DISCONTINUED | OUTPATIENT
Start: 2020-04-14 | End: 2020-04-17 | Stop reason: HOSPADM

## 2020-04-14 RX ORDER — NEOMYCIN SULFATE, POLYMYXIN B SULFATE, AND DEXAMETHASONE 3.5; 10000; 1 MG/G; [USP'U]/G; MG/G
OINTMENT OPHTHALMIC PRN
Status: DISCONTINUED | OUTPATIENT
Start: 2020-04-14 | End: 2020-04-17 | Stop reason: HOSPADM

## 2020-04-14 RX ORDER — LIDOCAINE 40 MG/G
CREAM TOPICAL
Status: DISCONTINUED | OUTPATIENT
Start: 2020-04-14 | End: 2020-04-14 | Stop reason: HOSPADM

## 2020-04-14 RX ORDER — SODIUM CHLORIDE, SODIUM LACTATE, POTASSIUM CHLORIDE, CALCIUM CHLORIDE 600; 310; 30; 20 MG/100ML; MG/100ML; MG/100ML; MG/100ML
INJECTION, SOLUTION INTRAVENOUS CONTINUOUS
Status: DISCONTINUED | OUTPATIENT
Start: 2020-04-14 | End: 2020-04-14 | Stop reason: HOSPADM

## 2020-04-14 RX ADMIN — ACETAMINOPHEN 650 MG: 325 TABLET, FILM COATED ORAL at 11:25

## 2020-04-14 RX ADMIN — FENTANYL CITRATE 50 MCG: 50 INJECTION, SOLUTION INTRAMUSCULAR; INTRAVENOUS at 08:07

## 2020-04-14 RX ADMIN — LIDOCAINE HYDROCHLORIDE 20 MG: 20 INJECTION, SOLUTION INFILTRATION; PERINEURAL at 09:09

## 2020-04-14 RX ADMIN — PROPOFOL 50 MG: 10 INJECTION, EMULSION INTRAVENOUS at 08:07

## 2020-04-14 RX ADMIN — FENTANYL CITRATE 25 MCG: 50 INJECTION, SOLUTION INTRAMUSCULAR; INTRAVENOUS at 09:11

## 2020-04-14 RX ADMIN — MOXIFLOXACIN HYDROCHLORIDE 1 DROP: 5 SOLUTION/ DROPS OPHTHALMIC at 07:31

## 2020-04-14 RX ADMIN — PROPOFOL 20 MG: 10 INJECTION, EMULSION INTRAVENOUS at 09:26

## 2020-04-14 RX ADMIN — MOXIFLOXACIN HYDROCHLORIDE 1 DROP: 5 SOLUTION/ DROPS OPHTHALMIC at 07:41

## 2020-04-14 RX ADMIN — FENTANYL CITRATE 25 MCG: 50 INJECTION, SOLUTION INTRAMUSCULAR; INTRAVENOUS at 09:26

## 2020-04-14 RX ADMIN — SODIUM CHLORIDE, POTASSIUM CHLORIDE, SODIUM LACTATE AND CALCIUM CHLORIDE: 600; 310; 30; 20 INJECTION, SOLUTION INTRAVENOUS at 08:00

## 2020-04-14 RX ADMIN — LIDOCAINE HYDROCHLORIDE 60 MG: 20 INJECTION, SOLUTION INFILTRATION; PERINEURAL at 08:07

## 2020-04-14 RX ADMIN — MOXIFLOXACIN HYDROCHLORIDE 1 DROP: 5 SOLUTION/ DROPS OPHTHALMIC at 07:36

## 2020-04-14 RX ADMIN — PROPOFOL 10 MG: 10 INJECTION, EMULSION INTRAVENOUS at 09:09

## 2020-04-14 ASSESSMENT — SLIT LAMP EXAM - LIDS
COMMENTS: NORMAL
COMMENTS: NORMAL

## 2020-04-14 ASSESSMENT — EXTERNAL EXAM - RIGHT EYE: OD_EXAM: NORMAL

## 2020-04-14 ASSESSMENT — EXTERNAL EXAM - LEFT EYE: OS_EXAM: NORMAL

## 2020-04-14 ASSESSMENT — VISUAL ACUITY
METHOD: SNELLEN - LINEAR
OS_CC: 20/30-2
OD_CC: 20/40-2
CORRECTION_TYPE: GLASSES

## 2020-04-14 ASSESSMENT — TONOMETRY
OS_IOP_MMHG: 15
OD_IOP_MMHG: 12
IOP_METHOD: TONOPEN

## 2020-04-14 ASSESSMENT — MIFFLIN-ST. JEOR: SCORE: 1299

## 2020-04-14 NOTE — PROGRESS NOTES
1)POAG OD>OS -- s/p Tube revision with graft OD (2/13/20 with Dr. Valdovinos), s/p Baerveldt Tube OD (4/25/19 with Dr. Valdovinos), s/p SLT OD per old notes, +steroid responder per old notes --K pachy: 544/557   Tmax:  60 Per old notes  HVF: OD:Inferiuor arcuate and OS: Inferior arcuate    CDR:     HRT/OCT: OD:Severe RNFL thinning and OS:Mod RNFl thinning      FHX of Glc: Mother, father and son per old notes     Gonio:       Intolerant to:      Asthma/COPD: No, tolerating topical BB  Steroid Use:     Kidney Stones:     Sulfa Allergy:      IOP targets:  2)PCIOL OU  -- follows with Dr. Eaton  3)NVAMD OD s/p Avastin   4)DM s DR  5)H/O CVA, CAD s/p CABG -- on ASA    Patient will continue on glaucoma drops as previously instructed.  A long discussion of the risks, benefits, and alternatives including potential treatment and management options were had with patient and a decision was made to proceed with emergent tube revision surgery in the right eye.    Attending Physician Attestation:  Complete documentation of historical and exam elements from today's encounter can be found in the full encounter summary report (not reduplicated in this progress note). I personally obtained the chief complaint(s) and history of present illness.  I confirmed and edited as necessary the review of systems, past medical/surgical history, family history, social history, and examination findings as documented by others; and I examined the patient myself. I personally reviewed the relevant tests, images, and reports as documented above. I formulated and edited as necessary the assessment and plan and discussed the findings and management plan with the patient and family.  - Loreta Gamboa MD

## 2020-04-14 NOTE — ANESTHESIA CARE TRANSFER NOTE
Patient: Krystian Ramos    Procedure(s):  RIGHT REVISION, TUBE INSERTION, FOR GLAUCOMA    Diagnosis: Exposure of ocular implant [T85.9XXA]  Diagnosis Additional Information: No value filed.    Anesthesia Type:   MAC     Note:  Airway :Room Air  Patient transferred to:PACU  Comments: At end of procedure, spontaneous respirations, patient alert to voice, able to follow commands. Patient breathing room air to PACU. SpO2, and NiBP monitors and alarms on and functioning, report on patient's clinical status given to PACU RN, RN questions answered.  Handoff Report: Identifed the Patient, Identified the Reponsible Provider, Reviewed the pertinent medical history, Discussed the surgical course, Reviewed Intra-OP anesthesia mangement and issues during anesthesia, Set expectations for post-procedure period and Allowed opportunity for questions and acknowledgement of understanding      Vitals: (Last set prior to Anesthesia Care Transfer)    CRNA VITALS  4/14/2020 0954 - 4/14/2020 1028      4/14/2020             NIBP:  (!) 176/105    NIBP Mean:  144                Electronically Signed By: ARABELLA Jeronimo CRNA  April 14, 2020  10:28 AM

## 2020-04-14 NOTE — ANESTHESIA PREPROCEDURE EVALUATION
"Anesthesia Pre-Procedure Evaluation    Patient: Krystian Ramos   MRN:     [id number] Gender:   female   Age:    74 year old :      1945        Preoperative Diagnosis: History of glaucoma tube shunt procedure [Z96.89]   Procedure(s):  REVISION, GLAUCOMA TUBE SHUNT WITH TUTOPLAST SCLERA     LABS:  CBC: No results found for: WBC, HGB, HCT, PLT  BMP: No results found for: NA, POTASSIUM, CHLORIDE, CO2, BUN, CR, GLC  COAGS: No results found for: PTT, INR, FIBR  POC:   Lab Results   Component Value Date     (H) 2020     OTHER: No results found for: PH, LACT, A1C, ML, PHOS, MAG, ALBUMIN, PROTTOTAL, ALT, AST, GGT, ALKPHOS, BILITOTAL, BILIDIRECT, LIPASE, AMYLASE, JOVON, TSH, T4, T3, CRP, SED     Preop Vitals    BP Readings from Last 3 Encounters:   20 (!) 167/86   20 (!) 149/80   19 130/75    Pulse Readings from Last 3 Encounters:   20 59   19 62      Resp Readings from Last 3 Encounters:   20 16   20 14   19 16    SpO2 Readings from Last 3 Encounters:   20 95%   20 96%   19 94%      Temp Readings from Last 1 Encounters:   20 36.9  C (98.5  F) (Oral)    Ht Readings from Last 1 Encounters:   20 1.626 m (5' 4\")      Wt Readings from Last 1 Encounters:   20 82.4 kg (181 lb 10.5 oz)    Estimated body mass index is 31.18 kg/m  as calculated from the following:    Height as of an earlier encounter on 20: 1.626 m (5' 4\").    Weight as of an earlier encounter on 20: 82.4 kg (181 lb 10.5 oz).     LDA:        Past Medical History:   Diagnosis Date     Coronary artery disease 2016    x1 coronary stent      Diabetes (H)      Glaucoma     per pt report     History of coronary artery bypass graft 2015    x3 vessels     Hypercholesteremia      Hypertension      Macular degeneration      Stroke (H)       Past Surgical History:   Procedure Laterality Date     APPENDECTOMY       CARDIAC SURGERY      3V CABG     CATARACT IOL, " RT/LT Bilateral      GENITOURINARY SURGERY  2018    Bladder sling surgery     GYN SURGERY      TUBAL LIGATION     HC TRABECULOPLASTY BY LASER SURGERY Right      IMPLANT VALVE EYE Right 4/25/2019    Procedure: RIGHT EYE BAERVELDT TUBE WITH CORNEAL PATCH GRAFT;  Surgeon: Elizabeth Valdovinos MD;  Location: [facility]     ORTHOPEDIC SURGERY Right     TKA     REVISE GLAUCOMA SHUNT Right 2/13/2020    Procedure: REVISION, GLAUCOMA TUBE SHUNT WITH TUTOPLAST SCLERA;  Surgeon: Elizabeth Valdovinos MD;  Location: [facility] OR     SHOULDER SURGERY        Allergies   Allergen Reactions     Ciprofloxacin Other (See Comments)     Lisinopril Cough     Pramipexole Other (See Comments)     Causes hypotension/dizziness     Raloxifene Rash               JZG FV AN PHYSICAL EXAM      Assessment:   ASA SCORE: 3    H&P: History and physical reviewed and following examination; no interval change.    NPO Status: NPO Appropriate     Plan:   Anes. Type:  MAC   Pre-Medication: None   Induction:  N/a   Airway: Native Airway   Access/Monitoring: PIV   Maintenance: N/a     Postop Plan:   Postop Pain: None  Postop Sedation/Airway: Not planned  Disposition: Outpatient     PONV Management: Adult Risk Factors: Female   Prevention: Ondansetron, Dexamethasone     CONSENT: Direct conversation   Plan and risks discussed with: Patient                          Zev Ny MD     ANESTHESIA PREOP EVALUATION    PROCEDURE: Procedure(s):  RIGHT REVISION, TUBE OR SHUNT INSERTION, FOR GLAUCOMA    HPI: Krystian Ramos is a 74 year old female who presents for above procedure 2/2 glaucoma.     PAST MEDICAL HISTORY:    Past Medical History:   Diagnosis Date     Coronary artery disease 2016    x1 coronary stent      Diabetes (H)      Glaucoma     per pt report     History of coronary artery bypass graft 2015    x3 vessels     Hypercholesteremia      Hypertension      Macular degeneration      Stroke (H) 2006       PAST SURGICAL HISTORY:    Past Surgical History:   Procedure  Laterality Date     APPENDECTOMY       CARDIAC SURGERY      3V CABG     CATARACT IOL, RT/LT Bilateral      GENITOURINARY SURGERY  2018    Bladder sling surgery     GYN SURGERY      TUBAL LIGATION     HC TRABECULOPLASTY BY LASER SURGERY Right      IMPLANT VALVE EYE Right 4/25/2019    Procedure: RIGHT EYE BAERVELDT TUBE WITH CORNEAL PATCH GRAFT;  Surgeon: Elizabeth Valdovinos MD;  Location:  [facility]     ORTHOPEDIC SURGERY Right     TKA     REVISE GLAUCOMA SHUNT Right 2/13/2020    Procedure: REVISION, GLAUCOMA TUBE SHUNT WITH TUTOPLAST SCLERA;  Surgeon: Elizabeth Valdovinos MD;  Location: [facility] OR     SHOULDER SURGERY         SOCIAL HISTORY:       Social History     Tobacco Use     Smoking status: Never Smoker     Smokeless tobacco: Never Used   Substance Use Topics     Alcohol use: Yes     Comment: a glass of wine occasionally       ALLERGIES:     Allergies   Allergen Reactions     Ciprofloxacin Other (See Comments)     Lisinopril Cough     Pramipexole Other (See Comments)     Causes hypotension/dizziness     Raloxifene Rash       MEDICATIONS:     (Not in a hospital admission)      No current outpatient medications on file.       Current Facility-Administered Medications Ordered in Epic   Medication Dose Route Frequency Provider Last Rate Last Dose     bupivacaine 0.75% (pf) 4.5mL + lidocaine 2% (pf) 4.5mL + hyaluronidase (HYLENEX) 150 units   Retrobulbar Once Loreta Gamboa MD         lactated ringers infusion   Intravenous Continuous Jonathan Sarah MD         lidocaine (LMX4) cream   Topical Q1H PRN Jonathan Sarah MD         lidocaine 1 % 0.1-1 mL  0.1-1 mL Other Q1H PRN Jonathan Sarah MD         moxifloxacin (VIGAMOX) 0.5 % ophthalmic solution 1 drop  1 drop Ophthalmic q5 Min Prior to Surgery Loreta Gamboa MD         sodium chloride (PF) 0.9% PF flush 3 mL  3 mL Intracatheter q1 min prn Jonathan Sarah MD         sodium chloride (PF) 0.9% PF flush 3 mL   3 mL Intracatheter Q8H Jonathan Sarah MD         No current [facility]-ordered outpatient medications on file.       PHYSICAL EXAM:    Vitals: T 97.6, P 59, /79, R 16, SpO2  , Weight   Wt Readings from Last 2 Encounters:   04/14/20 82.4 kg (181 lb 10.5 oz)   02/13/20 85.3 kg (188 lb)       See doc flowsheet    NPO STATUS: see doc flowsheet    LABS:    BMP:  No results for input(s): NA, POTASSIUM, CHLORIDE, CO2, BUN, CR, GLC, ML in the last 61336 hours.    LFTs:   No results for input(s): PROTTOTAL, ALBUMIN, BILITOTAL, ALKPHOS, AST, ALT, BILIDIRECT in the last 48940 hours.    CBC:   No results for input(s): WBC, RBC, HGB, HCT, MCV, MCH, MCHC, RDW, PLT in the last 82067 hours.    Coags:  No results for input(s): INR, PTT, FIBR in the last 25309 hours.    Imaging:  No orders to display       Deuce Francis MD  Anesthesiology Staff  Pager [phone]    2/13/2020  1:11 PM

## 2020-04-14 NOTE — PATIENT INSTRUCTIONS
Patient will continue on glaucoma drops as previously instructed.  A long discussion of the risks, benefits, and alternatives including potential treatment and management options were had with patient and a decision was made to proceed with emergent tube revision surgery in the right eye.

## 2020-04-14 NOTE — OP NOTE
Pre-operative diagnosis: Right Eye Glaucoma with Exposed tube   Post-operative diagnosis Same   Procedure:    Anesthesia: Procedure(s):  Tube revision, Right  Retrobulbar Block   Surgeon: Loreta Gamboa MD   Assistants(s): None   Estimated blood loss: Minimal                         Specimens: None   Findings:  Complications: None  None       Indication: Patient was noted to have glaucoma and previously underwent tube surgery 1 year ago with subsequent revision for exposure 2 months ago.  Patient presented with a recurrence of her exposed tube in the right eye.       After informed consent was obtained, the patient was wheeled to the operative suite. Preoperatively the patient received a retrobulbar block consisting of a 1:1 mixture of 2% Lidocaine and 0.75% Bupivicaine under propofol anesthesia. Patient was then prepped and draped in the usual sterile fashion. A lid speculum was placed between the right upper and lower eyelids. A 0.12 forceps and Vannas scissors were used to create a conjunctival peritomy extending from the 2 o'clock to 10 o'clock locations. The conjunctiva was noted to be markedly friable especially in the area surrounding the exposed tube.  A Lynnette scissors and nontoothed forceps were then used to dissect free the tenons attachments and surrounding scar tissue. A Hemostasis was maintained with wet-field electrocautery. A 0.12 forceps and 1.0mm sideport blade were then used to make a paracentesis inferotemporally through the clear cornea. Viscoat was injected into the anterior chamber.  The tube was removed from the eye.  2 simple 10-0 nylons were used to close the previous scleral tunnel entry into the anterior chamber.  Further dissection of the tissues surrounding the tube revealed conjunctival tissue that had grown under the tube.  The conjunctiva was dissected back to the plate leaving bare sclera.  Hemostasis was once again used on the scleral bed.  A ceci's tenotomy scissors and  non-toothed forceps were used to enlarge the pocket in the superior quadrant.  A tube inserting forceps and BSS on a 27 gauge cannula were then used to prime the implant and it was found to be in good working order. A tube extender was obtained and was adhered to the previous tube using the tube inserting forceps and a 0.12 forceps.  2 simple 10-0 nylon sutures were used to adhere the tube extender plate to the episclera superiorly.   A tube inserting forceps and BSS on a 27 gauge cannula were then used to re-prime the implant and it was found to be in good working order. A 23 gauge needle and 0.12 forceps were then used to make a tunnel into the anterior chamber from approximately 1-2mm posterior to the limbus in the superior quadrant. The tube inserting forceps and 0.12 forceps were then used to insert the tube into the anterior chamber. The tube was noted to lie in good position just anterior the iris plane. A 10-0 nylon suture was then used to adhere the tube to the episclera. Visiongraft cornea patch graft was then adhered to the episclera using two simple 10-0 nylon interrupted sutures at the leading edges of the graft. A 0.5x0.8mm tutoplast sclera patch graft was used to cover the previous scleral bed and previous scleral tunnel entry site.  The graft was adhered to the episclera used 2 simple 10-0 nylon interrupted sutures. The grafts were noted to cover the tube extender and previous operative site well. The conjunctiva was reapproximated to the limbus using 4 simple 10-0 nylon interrupted sutures. The conjunctiva was reapproximated back to itself in the temporal quadrants using 4 simple 10-0 nylon interrupted sutures. At the conclusion of the case the patient received subconjunctival injections of dexamethasone and cefazolin.  The patient then had Maxitrol ophthalmic ointment applied to the ocular surface of the right eye as well as a pressure patch and valderrama shield.   The patient was wheeled to the recovery  area in stable condition.

## 2020-04-14 NOTE — BRIEF OP NOTE
[facility] Brief Operative Note    Pre-operative diagnosis: Exposure of ocular implant [T85.9XXA]   Post-operative diagnosis Same   Procedure: Procedure(s):  RIGHT REVISION, TUBE OR SHUNT INSERTION, FOR GLAUCOMA   Surgeon(s): Surgeon(s) and Role:     * Loreta Gamboa MD - Primary     * Jolanta Woods MD - Resident - Assisting   Estimated blood loss: Minimal    Specimens: * No specimens in log *   Findings: See formal op note    Jolanta Woods MD  Ophthalmology Resident, PGY-3

## 2020-04-14 NOTE — PROGRESS NOTES
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 Gamboa), s/p Tube revision with graft OD (2/13/20 with Dr. Valdovinos), s/p Baerveldt Tube OD (4/25/19 with Dr. Valdovinos), s/p SLT OD per old notes, +steroid responder per old notes --K pachy: 544/557   Tmax:  60 Per old notes  HVF: OD:Inferiuor arcuate and OS: Inferior arcuate    CDR:     HRT/OCT: OD:Severe RNFL thinning and OS:Mod RNFl thinning      FHX of Glc: Mother, father and son per old notes     Gonio:       Intolerant to:      Asthma/COPD: No, tolerating topical BB  Steroid Use:     Kidney Stones:     Sulfa Allergy:      IOP targets:  2)PCIOL OU  -- follows with Dr. Eaton  3)NVAMD OD s/p Avastin   4)DM s DR  5)H/O CVA, CAD s/p CABG -- on ASA    Patient will continue on glaucoma drops in the left eye only as previously instructed.  No heavy lifting, bending, stooping, straining, rubbing the eye, or getting water in the eye.  Please wear protective eyewear over the eye at all times including glasses during the day and the protective shield at bedtime.  Patient will return to clinic in 1 week with Dr. Eaton with repeat evaluation.    Use the following drops (RIGHT EYE ONLY):  Antibiotic --  Moxifloxacin (tan top): 4x/day until seen in clinic    Steroid -- Pred Forte/Prednisolone Acetate: 2x/day for 1 week then 1x/day for 1 week then discontinue    Use Maxitrol ointment (white tube) as needed at bedtime for comfort    STOP glaucoma drops in the right eye, continue glaucoma drops as previously instructed in the left eye.    Please be sure to call if you have any decrease in vision, increase in pain, flashing lights, redness, or a lot of drainage.      Jolanta Woods MD  Ophthalmology Resident, PGY-3    Case discussed with Dr. Gamboa.

## 2020-04-14 NOTE — ANESTHESIA POSTPROCEDURE EVALUATION
Anesthesia POST Procedure Evaluation    Patient: Krystian Ramos   MRN:     [id number] Gender:   female   Age:    74 year old :      1945        Preoperative Diagnosis: Exposure of ocular implant [T85.9XXA]   Procedure(s):  RIGHT REVISION, TUBE INSERTION, FOR GLAUCOMA   Postop Comments: No value filed.     Anesthesia Type: MAC          Postop Pain Control: Uneventful            Sign Out: Well controlled pain   PONV: No   Neuro/Psych: Uneventful            Sign Out: Acceptable/Baseline neuro status   Airway/Respiratory: Uneventful            Sign Out: Acceptable/Baseline resp. status   CV/Hemodynamics: Uneventful            Sign Out: Acceptable CV status   Other NRE: NONE   DID A NON-ROUTINE EVENT OCCUR? No         Last Anesthesia Record Vitals:  CRNA VITALS  2020 0954 - 2020 1054      2020             NIBP:  (!) 176/105    NIBP Mean:  144          Last PACU Vitals:  Vitals Value Taken Time   /103 2020 10:27 AM   Temp 36.6  C (97.9  F) 2020 10:27 AM   Pulse     Resp 12 2020 10:27 AM   SpO2 96 % 2020 10:27 AM   Temp src     NIBP     Pulse     SpO2     Resp     Temp     Ht Rate     Temp 2           Electronically Signed By: Zev Ny MD, 2020, 1:43 PM

## 2020-04-14 NOTE — DISCHARGE INSTRUCTIONS
No sleeping on operative side.  No bending or lifting.  No itching, scratching or rubbing surgical eye; leave eye shield in place until follow up appt.  Ok to take tylenol for discomfort.  Contact MD if having extreme pain in surgical eye.    Follow up appt in eye clinic 4/15/20 at 10am.  Location for appt: [facility] 9th floor    Same-Day Surgery   Adult Discharge Orders & Instructions     For 24 hours after surgery:  1. Get plenty of rest.  A responsible adult must stay with you for at least 24 hours after you leave the hospital.   2. Pain medication can slow your reflexes. Do not drive or use heavy equipment.  If you have weakness or tingling, don't drive or use heavy equipment until this feeling goes away.  3. Mixing alcohol and pain medication can cause dizziness and slow your breathing. It can even be fatal. Do not drink alcohol while taking pain medication.  4. Avoid strenuous or risky activities.  Ask for help when climbing stairs.   5. You may feel lightheaded.  If so, sit for a few minutes before standing.  Have someone help you get up.   6. If you have nausea (feel sick to your stomach), drink only clear liquids such as apple juice, ginger ale, broth or 7-Up.  Rest may also help.  Be sure to drink enough fluids.  Move to a regular diet as you feel able. Take pain medications with a small amount of solid food, such as toast or crackers, to avoid nausea.   7. A slight fever is normal. Call the doctor if your fever is over 100 F (37.7 C) (taken under the tongue) or lasts longer than 24 hours.  8. You may have a dry mouth, muscle aches, trouble sleeping or a sore throat.  These symptoms should go away after 24 hours.  9. Do not make important or legal decisions.   Pain Management:      1. Take pain medication (if prescribed) for pain as directed by your physician.        2. WARNING: If the pain medication you have been prescribed contains Tylenol  (acetaminophen), DO NOT take additional  "doses of Tylenol (acetaminophen).     Call your doctor for any of the followin.  Signs of infection (fever, growing tenderness at the surgery site, severe pain, a large amount of drainage or bleeding, foul-smelling drainage, redness, swelling).    2.  It has been over 8 to 10 hours since surgery and you are still not able to urinate (pee).    3.  Headache for over 24 hours.    4.  Numbness, tingling or weakness the day after surgery (if you had spinal anesthesia).  To contact a doctor, call Eye Clinic or:      [phone] and ask for the Resident On Call for:          Opthamology (answered 24 hours a day)      Emergency Department:  [facility]: [phone]  [facility]: [phone]    [facility]    Discharge Instructions after Glaucoma Surgery  Dr. Loreta Gamboa and Dr. Elizabeth Valdovinos  [phone]  General Care:    Take your post-operative eye drops according to the instructions.    o Wait 1 -3 minutes between eye drops.    Wear a shield at bedtime  o For tube and cataract surgery wear your shield for one week  o For trabeculectomy surgery wear your shield for two weeks    Proper placement of the shield: place the bump of the shield on the bridge of your nose, resting the shield on the orbital bones. Secure the shield with one piece of tape, from forehead to cheek.    Approved activities (OK to do the following):    Please clean around the eye(s) gently with tissue or washcloth    Leaning over the sink to brush your teeth    Going up and down stairs    Walking for exercise    Watching TV or reading    After your clinic appointment tomorrow, you may shower, including putting your head under the shower, making sure to close your eyes    Restricted activities:     No bending such as \"toe-touching\"    Keep head above level of heart    Sit down to put on shoes    No heavy lifting (10 pound restriction, equal to the weight of a " gallon of milk)    Do not push or rub eye    Call for any problems or questions:  [facility]   [phone]    There is always someone on call, even on weekends

## 2020-04-15 ENCOUNTER — OFFICE VISIT (OUTPATIENT)
Dept: OPHTHALMOLOGY | Facility: CLINIC | Age: 75
End: 2020-04-15
Attending: OPHTHALMOLOGY
Payer: COMMERCIAL

## 2020-04-15 DIAGNOSIS — Z98.890 POSTOPERATIVE EYE STATE: Primary | ICD-10-CM

## 2020-04-15 ASSESSMENT — TONOMETRY
OS_IOP_MMHG: 19
OD_IOP_MMHG: 8
OD_IOP_MMHG: 5
IOP_METHOD: APPLANATION
OD_IOP_MMHG: 15
IOP_METHOD: APPLANATION
IOP_METHOD: TONOPEN

## 2020-04-15 ASSESSMENT — VISUAL ACUITY
OS_SC: 20/50
METHOD: SNELLEN - LINEAR
OD_SC: 20/150
OS_PH_SC: 20/30

## 2020-04-15 ASSESSMENT — EXTERNAL EXAM - LEFT EYE: OS_EXAM: NORMAL

## 2020-04-15 ASSESSMENT — SLIT LAMP EXAM - LIDS
COMMENTS: NORMAL
COMMENTS: NORMAL

## 2020-04-15 ASSESSMENT — EXTERNAL EXAM - RIGHT EYE: OD_EXAM: NORMAL

## 2020-04-15 NOTE — PATIENT INSTRUCTIONS
Patient will continue on glaucoma drops in the left eye as previously instructed.  No heavy lifting, bending, stooping, straining, rubbing the eye, or getting water in the eye.  Please wear protective eyewear over the eye at all times including glasses during the day and the protective shield at bedtime.  Patient will return to clinic in 1 week with Dr. Eaton with repeat evaluation.    Use the following drops ( LEFT EYE):  Combigan (blue top) 2x daily   Lumigan (teal top) at bedtime     Use the following drops (RIGHT EYE ONLY):    STOP GLAUCOMA DROPS IN RIGHT EYE    Antibiotic --  Moxifloxacin (tan top): 4x/day until seen in clinic (at least 2 weeks)    Steroid -- Pred Forte/Prednisolone Acetate: 3x/day for 1 week then 2x/day for 1 week then 1x/day for 1 week then discontinue    Use Maxitrol ointment (white tube) as needed at bedtime for comfort    Please be sure to call if you have any decrease in vision, increase in pain, flashing lights, redness, or a lot of drainage.

## 2020-04-15 NOTE — NURSING NOTE
Chief Complaints and History of Present Illnesses   Patient presents with     Post Op (Ophthalmology) Right Eye     Chief Complaint(s) and History of Present Illness(es)     Post Op (Ophthalmology) Right Eye     Laterality: right eye    Onset: gradual    Onset: days ago    Quality: blurred vision    Associated symptoms: eye pain (with movement of the eye), redness and tearing    Pain scale: 0/10              Comments     +swelling  Slept well  Ramona Smart COT 9:50 AM April 15, 2020

## 2020-04-21 ENCOUNTER — TRANSFERRED RECORDS (OUTPATIENT)
Dept: HEALTH INFORMATION MANAGEMENT | Facility: CLINIC | Age: 75
End: 2020-04-21

## (undated) DEVICE — SYR 01ML 27GA 0.5" NDL TBC 309623

## (undated) DEVICE — EYE KNIFE STILETTO VISITEC 1.1MM ANG 45DEG SIDEPORT 376620

## (undated) DEVICE — GLOVE PROTEXIS POWDER FREE SMT 6.5  2D72PT65X

## (undated) DEVICE — EYE NDL RETROBULBAR 25GA 1.5" 581275

## (undated) DEVICE — SYR 10ML SLIP TIP W/O NDL

## (undated) DEVICE — EYE KNIFE STAB 15DEG PE3015

## (undated) DEVICE — EYE TIP BIPOLAR 18GA ERASER 221250

## (undated) DEVICE — EYE SHIELD PLASTIC

## (undated) DEVICE — SPONGE SPEAR WECK CEL 6/PKG 0008680

## (undated) DEVICE — DRSG EYE PAD STERILE 1.63X2.63" NON21600

## (undated) DEVICE — EYE COVER TONOPEN OCU FILM LATEX 230651-002

## (undated) DEVICE — PACK CATARACT CUSTOM SO DALE SEY32CTFCX

## (undated) DEVICE — LINEN TOWEL PACK X5 5464

## (undated) DEVICE — SU VICRYL 7-0 TG140-8DA 18" J546G

## (undated) DEVICE — GLOVE PROTEXIS MICRO 7.5  2D73PM75

## (undated) DEVICE — NDL 25GA 1.5" 305127

## (undated) DEVICE — NDL 23GAX1.25" 305120

## (undated) DEVICE — EYE DRSG PAD OVAL

## (undated) DEVICE — SU VICRYL 9-0 BV100-3 5" V402G

## (undated) DEVICE — NDL 19GA 1.5"

## (undated) DEVICE — EYE KNIFE CRESCENT 55DEG 373807

## (undated) DEVICE — EYE SPONGE SPEAR WECK CEL 0008685

## (undated) DEVICE — BLADE KNIFE BEAVER MICROSHARP GREEN 377515

## (undated) DEVICE — PACK CATARACT UMMC

## (undated) DEVICE — NDL 30GA 0.5" 305106

## (undated) DEVICE — Device

## (undated) DEVICE — ESU CORD BIPOLAR GREEN 10-4000

## (undated) DEVICE — PEN MARKING SKIN FINE 31145942

## (undated) DEVICE — SU ETHILON 10-0 CS160-6 12" 9000G

## (undated) DEVICE — SOL WATER IRRIG 500ML BOTTLE 2F7113

## (undated) DEVICE — SOL NACL 0.9% 10ML VIAL 0409-4888-02

## (undated) DEVICE — SYR 01ML LL W/O NDL LATEX FREE 309628

## (undated) DEVICE — SU PROLENE 9-0 TG140-8DA 6" 1754G

## (undated) DEVICE — PACK CATARACT CUSTOM ASC SEY15CPUMC

## (undated) DEVICE — EYE PREP BETADINE 5% SOLUTION 30ML 0065-0411-30

## (undated) DEVICE — SYR 05ML LL W/O NDL

## (undated) DEVICE — SU ETHILON 8-0 TG175-8 12" 1716G

## (undated) DEVICE — TAPE MICROPORE 1"X1.5YD 1530S-1

## (undated) DEVICE — NDL 23GA 1" 305145

## (undated) DEVICE — ESU HOLSTER PLASTIC DISP E2400

## (undated) DEVICE — EYE NDL RETROBULBAR 25GA 60-111637

## (undated) DEVICE — TAPE TRANSPORE 1"X1.5YD 1534S-1

## (undated) DEVICE — APPLICATOR COTTON TIP 6"X2 STERILE LF 6012

## (undated) DEVICE — GLOVE PROTEXIS MICRO 6.5  2D73PM65

## (undated) DEVICE — EYE CANN IRR 27GA ANTERIOR CHAMBER 581280

## (undated) DEVICE — EYE CANN IRR 30GA  ANTERIOR CHAMBER 581273

## (undated) DEVICE — GLOVE PROTEXIS MICRO 7.0  2D73PM70

## (undated) DEVICE — STRAP KNEE/BODY 31143004

## (undated) RX ORDER — ACETAMINOPHEN 325 MG/1
TABLET ORAL
Status: DISPENSED
Start: 2020-04-14

## (undated) RX ORDER — GLYCOPYRROLATE 0.2 MG/ML
INJECTION INTRAMUSCULAR; INTRAVENOUS
Status: DISPENSED
Start: 2020-02-13

## (undated) RX ORDER — LIDOCAINE HYDROCHLORIDE 20 MG/ML
INJECTION, SOLUTION EPIDURAL; INFILTRATION; INTRACAUDAL; PERINEURAL
Status: DISPENSED
Start: 2020-02-13

## (undated) RX ORDER — ACETAMINOPHEN 325 MG/1
TABLET ORAL
Status: DISPENSED
Start: 2020-02-13

## (undated) RX ORDER — DEXAMETHASONE SODIUM PHOSPHATE 4 MG/ML
INJECTION, SOLUTION INTRA-ARTICULAR; INTRALESIONAL; INTRAMUSCULAR; INTRAVENOUS; SOFT TISSUE
Status: DISPENSED
Start: 2020-02-13

## (undated) RX ORDER — FENTANYL CITRATE 50 UG/ML
INJECTION, SOLUTION INTRAMUSCULAR; INTRAVENOUS
Status: DISPENSED
Start: 2020-02-13

## (undated) RX ORDER — MOXIFLOXACIN 5 MG/ML
SOLUTION/ DROPS OPHTHALMIC
Status: DISPENSED
Start: 2020-04-14

## (undated) RX ORDER — PROPOFOL 10 MG/ML
INJECTION, EMULSION INTRAVENOUS
Status: DISPENSED
Start: 2020-02-13

## (undated) RX ORDER — ONDANSETRON 2 MG/ML
INJECTION INTRAMUSCULAR; INTRAVENOUS
Status: DISPENSED
Start: 2020-02-13

## (undated) RX ORDER — DEXAMETHASONE SODIUM PHOSPHATE 4 MG/ML
INJECTION, SOLUTION INTRA-ARTICULAR; INTRALESIONAL; INTRAMUSCULAR; INTRAVENOUS; SOFT TISSUE
Status: DISPENSED
Start: 2019-04-25

## (undated) RX ORDER — FENTANYL CITRATE 50 UG/ML
INJECTION, SOLUTION INTRAMUSCULAR; INTRAVENOUS
Status: DISPENSED
Start: 2019-04-25

## (undated) RX ORDER — ONDANSETRON 2 MG/ML
INJECTION INTRAMUSCULAR; INTRAVENOUS
Status: DISPENSED
Start: 2019-04-25

## (undated) RX ORDER — FENTANYL CITRATE 50 UG/ML
INJECTION, SOLUTION INTRAMUSCULAR; INTRAVENOUS
Status: DISPENSED
Start: 2020-04-14